# Patient Record
Sex: MALE | Race: OTHER | HISPANIC OR LATINO | Employment: FULL TIME | URBAN - METROPOLITAN AREA
[De-identification: names, ages, dates, MRNs, and addresses within clinical notes are randomized per-mention and may not be internally consistent; named-entity substitution may affect disease eponyms.]

---

## 2019-01-08 ENCOUNTER — HOSPITAL ENCOUNTER (EMERGENCY)
Facility: HOSPITAL | Age: 24
Discharge: HOME | End: 2019-01-08
Attending: EMERGENCY MEDICINE
Payer: COMMERCIAL

## 2019-01-08 VITALS
WEIGHT: 185 LBS | OXYGEN SATURATION: 99 % | DIASTOLIC BLOOD PRESSURE: 74 MMHG | HEIGHT: 72 IN | BODY MASS INDEX: 25.06 KG/M2 | SYSTOLIC BLOOD PRESSURE: 152 MMHG | HEART RATE: 110 BPM | RESPIRATION RATE: 18 BRPM | TEMPERATURE: 99 F

## 2019-01-08 DIAGNOSIS — M25.562 LEFT KNEE PAIN: ICD-10-CM

## 2019-01-08 PROCEDURE — 25000003 PHARM REV CODE 250: Performed by: PHYSICIAN ASSISTANT

## 2019-01-08 PROCEDURE — 99283 EMERGENCY DEPT VISIT LOW MDM: CPT | Mod: 25

## 2019-01-08 RX ORDER — NAPROXEN 500 MG/1
500 TABLET ORAL 2 TIMES DAILY WITH MEALS
Qty: 14 TABLET | Refills: 0 | Status: SHIPPED | OUTPATIENT
Start: 2019-01-08 | End: 2019-07-23

## 2019-01-08 RX ORDER — HYDROCODONE BITARTRATE AND ACETAMINOPHEN 5; 325 MG/1; MG/1
1 TABLET ORAL EVERY 6 HOURS PRN
Qty: 10 TABLET | Refills: 0 | Status: ON HOLD | OUTPATIENT
Start: 2019-01-08 | End: 2019-01-21 | Stop reason: SDUPTHER

## 2019-01-08 RX ORDER — HYDROCODONE BITARTRATE AND ACETAMINOPHEN 10; 325 MG/1; MG/1
1 TABLET ORAL
Status: COMPLETED | OUTPATIENT
Start: 2019-01-08 | End: 2019-01-08

## 2019-01-08 RX ADMIN — HYDROCODONE BITARTRATE AND ACETAMINOPHEN 1 TABLET: 10; 325 TABLET ORAL at 01:01

## 2019-01-08 NOTE — ED PROVIDER NOTES
"Encounter Date: 1/8/2019       History     Chief Complaint   Patient presents with    Knee Pain     pt injured his left knee while working out 1-2 hours pta     Yossi James, a 23 y.o. male that presents to the ED for evaluation of left knee pain after doing a twisting/jumping movement while in a plank position.  Patient states that he had a jolt of pain, but no sensation of "pop".  No previous h/o of surgeries.  No treatments tried PTA.            The history is provided by the patient.     Review of patient's allergies indicates:  No Known Allergies  History reviewed. No pertinent past medical history.  History reviewed. No pertinent surgical history.  No family history on file.  Social History     Tobacco Use    Smoking status: Never Smoker   Substance Use Topics    Alcohol use: No     Frequency: Never    Drug use: Not on file     Review of Systems   Musculoskeletal: Positive for arthralgias (left knee pain ). Negative for joint swelling.   Skin: Negative for color change.   Allergic/Immunologic: Negative for immunocompromised state.   Neurological: Negative for weakness and light-headedness.   All other systems reviewed and are negative.      Physical Exam     Initial Vitals [01/08/19 1254]   BP Pulse Resp Temp SpO2   (!) 152/74 110 18 98.9 °F (37.2 °C) 99 %      MAP       --         Physical Exam    Nursing note and vitals reviewed.  Constitutional: He appears well-developed and well-nourished.   HENT:   Head: Normocephalic and atraumatic.   Right Ear: External ear normal.   Left Ear: External ear normal.   Nose: Nose normal.   Mouth/Throat: Oropharynx is clear and moist.   Eyes: EOM are normal.   Neck: Normal range of motion. Neck supple.   Cardiovascular: Normal rate and regular rhythm.   Pulmonary/Chest: Breath sounds normal. No respiratory distress.   Abdominal: Bowel sounds are normal.   Musculoskeletal: He exhibits no edema.        Left hip: Normal.        Left knee: He exhibits decreased range of " motion. He exhibits no swelling, no effusion, no ecchymosis, no deformity, no laceration, no erythema, normal alignment, no LCL laxity, normal patellar mobility, no bony tenderness, normal meniscus and no MCL laxity. Tenderness found. Lateral joint line and LCL tenderness noted. No patellar tendon tenderness noted.        Left ankle: Normal.   Left knee: no significant increase of pain with varus, valgus testing.  Negative Lochman's.  Other ROM testing limited d/t pain.     Neurological: He is alert and oriented to person, place, and time. No cranial nerve deficit.   Skin: Skin is warm and dry. Capillary refill takes less than 2 seconds. No rash and no abscess noted. No erythema.   Psychiatric: He has a normal mood and affect. Thought content normal.         ED Course   Procedures  Labs Reviewed - No data to display       Imaging Results          X-Ray Knee 3 View Left (Final result)  Result time 01/08/19 13:48:59    Final result by Dawson Kitchen Jr., MD (01/08/19 13:48:59)                 Impression:      No significant abnormality seen.      Electronically signed by: Dawson Kitchen MD  Date:    01/08/2019  Time:    13:48             Narrative:    EXAMINATION:  XR KNEE 3 VIEW LEFT    CLINICAL HISTORY:  Pain in left knee    TECHNIQUE:  AP, lateral, and Merchant views of the left knee were performed.    COMPARISON:  None    FINDINGS:  Bones are well mineralized.  Alignment is satisfactory.  No fracture or effusion.  Alignment is satisfactory.                                 Medical Decision Making:   Initial Assessment:   Acute left knee pain after exercise  Differential Diagnosis:   Fracture, dislocation, internal derangement of knee    ED Management:  Patient presents to ED for evaluation of acute left knee pain.  ROM exam limited d/t pain.  Norco and ice pack given.  X-ray shows no acute abnormality.  Suspect internal injury.  Gave patient instruction on RICE protocol and encouraged f/u with orthopedist for  possible MRI.  Knee immobilizer and crutches given.  Strict return precautions given and patient verbalized understanding.                            Clinical Impression:   The encounter diagnosis was Left knee pain.                             Funmilayo García PA-C  01/08/19 1507

## 2019-01-11 ENCOUNTER — HOSPITAL ENCOUNTER (OUTPATIENT)
Dept: RADIOLOGY | Facility: HOSPITAL | Age: 24
Discharge: HOME | End: 2019-01-11
Attending: PHYSICIAN ASSISTANT
Payer: COMMERCIAL

## 2019-01-11 DIAGNOSIS — M23.92 INTERNAL DERANGEMENT OF LEFT KNEE: ICD-10-CM

## 2019-01-11 DIAGNOSIS — S89.92XA LEFT KNEE INJURY, INITIAL ENCOUNTER: ICD-10-CM

## 2019-01-11 PROCEDURE — 73721 MRI KNEE WITHOUT CONTRAST LEFT: ICD-10-PCS | Mod: 26,LT,, | Performed by: RADIOLOGY

## 2019-01-11 PROCEDURE — 73721 MRI JNT OF LWR EXTRE W/O DYE: CPT | Mod: TC,LT

## 2019-01-11 PROCEDURE — 73721 MRI JNT OF LWR EXTRE W/O DYE: CPT | Mod: 26,LT,, | Performed by: RADIOLOGY

## 2019-01-15 ENCOUNTER — CLINICAL SUPPORT (OUTPATIENT)
Dept: LAB | Facility: HOSPITAL | Age: 24
End: 2019-01-15
Attending: ORTHOPAEDIC SURGERY
Payer: COMMERCIAL

## 2019-01-15 ENCOUNTER — HOSPITAL ENCOUNTER (OUTPATIENT)
Dept: RADIOLOGY | Facility: HOSPITAL | Age: 24
Discharge: HOME | End: 2019-01-15
Attending: ORTHOPAEDIC SURGERY
Payer: COMMERCIAL

## 2019-01-15 DIAGNOSIS — Z01.818 PREOP EXAMINATION: Primary | ICD-10-CM

## 2019-01-15 DIAGNOSIS — Z01.818 PREOP EXAMINATION: ICD-10-CM

## 2019-01-15 PROCEDURE — 71046 XR CHEST PA AND LATERAL: ICD-10-PCS | Mod: 26,,, | Performed by: RADIOLOGY

## 2019-01-15 PROCEDURE — 93010 EKG 12-LEAD: ICD-10-PCS | Mod: ,,, | Performed by: INTERNAL MEDICINE

## 2019-01-15 PROCEDURE — 93005 ELECTROCARDIOGRAM TRACING: CPT

## 2019-01-15 PROCEDURE — 71046 X-RAY EXAM CHEST 2 VIEWS: CPT | Mod: TC,FY

## 2019-01-15 PROCEDURE — 71046 X-RAY EXAM CHEST 2 VIEWS: CPT | Mod: 26,,, | Performed by: RADIOLOGY

## 2019-01-15 PROCEDURE — 93010 ELECTROCARDIOGRAM REPORT: CPT | Mod: ,,, | Performed by: INTERNAL MEDICINE

## 2019-01-18 NOTE — H&P
Reason For Visit    C/O LT Knee pain     History of Present Illness    KNEE PAIN: YURIDIA PACHECO complains of pain to the left knee.   PAIN LOCATED: Laterally, Posteriorly,   ONSET: 1 weeks ago.  QUALITY: Patient states the pain has remained the same   INJURY? Yes. Twisting at gym,   ASSOCIATED SYMPTOM AND TRIGGERS: Standing/Weightbearing, walking, trouble w stairs, stiffness, swelling, limping, stiffness w sitting   USES ASSISTIVE DEVICE: crutch   RELIEVED BY: Rest, Ice/heat, Medications: lortab some relief   PATIENT DENIES: Deformity, bruising, redness   HISTORY: Previous knee injury: No  mri shows acute posterior horn meniscus tear     Allergies   · No Known Environmental Allergies   · No Known Food Allergies   · No Known Allergies    Current Meds    Medication Name Instruction   Hydrocodone-Acetaminophen 5-325 MG Oral Tablet    Naproxen 500 MG Oral Tablet      Active Problems   · Internal derangement of left knee (M23.92)   · Left knee injury, initial encounter (S89.92XA)   · Quadriceps weakness (M62.81)    Past Medical History   · No pertinent past medical history   · History of No pertinent past surgical history    Family History   · Family history of arthritis (Z82.61)    Social History   · Never a smoker   · No alcohol use    Review of Systems    Review of systems have been reviewed and noted on the intake form     Vitals   Recorded: 15Jan2019 01:34PM   Height 6 ft    Weight 186 lb 4 oz   BMI Calculated 25.26   BSA Calculated 2.07   Systolic 120   Diastolic 78   Heart Rate 103   Respiration 18   Pain Scale 0   Location: Knee     Physical Exam    Mr. YURIDIA PACHECO is a 23 year old man seen at the request of Colette El for left knee pain.   He is a well developed male who is awake and alert and cooperative for the exam. Affect is appropriate, gait is antalgic .  The lower extremities are equal in length and in alignment. The contralateral extremity demonstrates FROM hip, knee, ankle, foot and toes.  The  affected extremity demonstrates effusion of the knee.  There is lateral joint line tenderness exacerbated by joint line palpation and compressive maneuvers   Lacie's is positive   The knee is ligamentously stable.  crepitus: no   Sensory is intact to light touch, pulses are intact bilaterally, and skin is clear and dry bilaterally  Left Knee ROM 15 - 40  Normal Muscle Tone Bilaterally     Assessment   1. Internal derangement of left knee (M23.92)    Plan    The patient presents today for Surgery for Left knee (ATS). MRI revealed a tear of the meniscus of the left knee. We discussed these findings with the patient. We did discuss arthroscopy and the fact that its role would hopefully help the symptoms related to meniscal tear; however, would not necessarily address any symptoms related to other cartilage damage, inflammation or injury. The patient was also offered a course of PT first but would rather move forward with arthroscopy. The patient understands this and would like to move forward. I think that is reasonable. We spent approx 15 min face to face reviewing the risks and benefits of surgery. We also reviewed the role of physical therapy vs arthroscopy. The patient would like the more quick improvement compared to the delayed improvement with PT. We will go ahead and schedule this at a time that is convenient for the patient.    There is no interval change to the H&P documented above.

## 2019-01-21 ENCOUNTER — ANESTHESIA EVENT (OUTPATIENT)
Dept: SURGERY | Facility: HOSPITAL | Age: 24
DRG: 489 | End: 2019-01-21
Payer: COMMERCIAL

## 2019-01-21 ENCOUNTER — HOSPITAL ENCOUNTER (INPATIENT)
Facility: HOSPITAL | Age: 24
LOS: 1 days | Discharge: HOME | DRG: 489 | End: 2019-01-21
Attending: ORTHOPAEDIC SURGERY | Admitting: ORTHOPAEDIC SURGERY
Payer: COMMERCIAL

## 2019-01-21 ENCOUNTER — ANESTHESIA (OUTPATIENT)
Dept: SURGERY | Facility: HOSPITAL | Age: 24
DRG: 489 | End: 2019-01-21
Payer: COMMERCIAL

## 2019-01-21 VITALS
OXYGEN SATURATION: 98 % | TEMPERATURE: 98 F | BODY MASS INDEX: 25.06 KG/M2 | HEIGHT: 72 IN | DIASTOLIC BLOOD PRESSURE: 64 MMHG | RESPIRATION RATE: 18 BRPM | HEART RATE: 66 BPM | SYSTOLIC BLOOD PRESSURE: 128 MMHG | WEIGHT: 185 LBS

## 2019-01-21 DIAGNOSIS — M17.9 OSTEOARTHRITIS OF KNEE, UNSPECIFIED LATERALITY, UNSPECIFIED OSTEOARTHRITIS TYPE: Primary | ICD-10-CM

## 2019-01-21 LAB
APPEARANCE FLD: NORMAL
BODY FLD TYPE: NORMAL
COLOR FLD: YELLOW
LYMPHOCYTES NFR FLD MANUAL: 29 %
MONOS+MACROS NFR FLD MANUAL: 61 %
NEUTROPHILS NFR FLD MANUAL: 10 %
WBC # FLD: 393 /CU MM

## 2019-01-21 PROCEDURE — 63600175 PHARM REV CODE 636 W HCPCS: Performed by: STUDENT IN AN ORGANIZED HEALTH CARE EDUCATION/TRAINING PROGRAM

## 2019-01-21 PROCEDURE — 71000016 HC POSTOP RECOV ADDL HR: Performed by: ORTHOPAEDIC SURGERY

## 2019-01-21 PROCEDURE — 25000003 PHARM REV CODE 250: Performed by: STUDENT IN AN ORGANIZED HEALTH CARE EDUCATION/TRAINING PROGRAM

## 2019-01-21 PROCEDURE — 63600175 PHARM REV CODE 636 W HCPCS: Performed by: ANESTHESIOLOGY

## 2019-01-21 PROCEDURE — 36000710: Performed by: ORTHOPAEDIC SURGERY

## 2019-01-21 PROCEDURE — 25000003 PHARM REV CODE 250: Performed by: ANESTHESIOLOGY

## 2019-01-21 PROCEDURE — 71000033 HC RECOVERY, INTIAL HOUR: Performed by: ORTHOPAEDIC SURGERY

## 2019-01-21 PROCEDURE — 63600175 PHARM REV CODE 636 W HCPCS: Performed by: ORTHOPAEDIC SURGERY

## 2019-01-21 PROCEDURE — 25000003 PHARM REV CODE 250: Performed by: ORTHOPAEDIC SURGERY

## 2019-01-21 PROCEDURE — 89051 BODY FLUID CELL COUNT: CPT

## 2019-01-21 PROCEDURE — 27201423 OPTIME MED/SURG SUP & DEVICES STERILE SUPPLY: Performed by: ORTHOPAEDIC SURGERY

## 2019-01-21 PROCEDURE — 37000009 HC ANESTHESIA EA ADD 15 MINS: Performed by: ORTHOPAEDIC SURGERY

## 2019-01-21 PROCEDURE — 37000008 HC ANESTHESIA 1ST 15 MINUTES: Performed by: ORTHOPAEDIC SURGERY

## 2019-01-21 PROCEDURE — 12000002 HC ACUTE/MED SURGE SEMI-PRIVATE ROOM

## 2019-01-21 PROCEDURE — C1713 ANCHOR/SCREW BN/BN,TIS/BN: HCPCS | Performed by: ORTHOPAEDIC SURGERY

## 2019-01-21 PROCEDURE — S0020 INJECTION, BUPIVICAINE HYDRO: HCPCS | Performed by: ORTHOPAEDIC SURGERY

## 2019-01-21 PROCEDURE — 71000015 HC POSTOP RECOV 1ST HR: Performed by: ORTHOPAEDIC SURGERY

## 2019-01-21 PROCEDURE — 36000711: Performed by: ORTHOPAEDIC SURGERY

## 2019-01-21 DEVICE — IMPLANTABLE DEVICE: Type: IMPLANTABLE DEVICE | Site: KNEE | Status: FUNCTIONAL

## 2019-01-21 DEVICE — KIT SEQUENT KNEE RECON: Type: IMPLANTABLE DEVICE | Site: KNEE | Status: FUNCTIONAL

## 2019-01-21 RX ORDER — SODIUM CHLORIDE, SODIUM LACTATE, POTASSIUM CHLORIDE, CALCIUM CHLORIDE 600; 310; 30; 20 MG/100ML; MG/100ML; MG/100ML; MG/100ML
INJECTION, SOLUTION INTRAVENOUS CONTINUOUS PRN
Status: DISCONTINUED | OUTPATIENT
Start: 2019-01-21 | End: 2019-01-21

## 2019-01-21 RX ORDER — MIDAZOLAM HYDROCHLORIDE 1 MG/ML
INJECTION, SOLUTION INTRAMUSCULAR; INTRAVENOUS
Status: DISCONTINUED | OUTPATIENT
Start: 2019-01-21 | End: 2019-01-21

## 2019-01-21 RX ORDER — ROCURONIUM BROMIDE 10 MG/ML
INJECTION, SOLUTION INTRAVENOUS
Status: DISCONTINUED | OUTPATIENT
Start: 2019-01-21 | End: 2019-01-21

## 2019-01-21 RX ORDER — CEFAZOLIN SODIUM 2 G/50ML
2 SOLUTION INTRAVENOUS ONCE
Status: COMPLETED | OUTPATIENT
Start: 2019-01-21 | End: 2019-01-21

## 2019-01-21 RX ORDER — CEFAZOLIN SODIUM 2 G/50ML
2 SOLUTION INTRAVENOUS
Status: DISCONTINUED | OUTPATIENT
Start: 2019-01-21 | End: 2019-07-23

## 2019-01-21 RX ORDER — SODIUM CHLORIDE 0.9 % (FLUSH) 0.9 %
3 SYRINGE (ML) INJECTION
Status: DISCONTINUED | OUTPATIENT
Start: 2019-01-21 | End: 2019-01-21 | Stop reason: HOSPADM

## 2019-01-21 RX ORDER — DEXAMETHASONE SODIUM PHOSPHATE 4 MG/ML
INJECTION, SOLUTION INTRA-ARTICULAR; INTRALESIONAL; INTRAMUSCULAR; INTRAVENOUS; SOFT TISSUE
Status: DISCONTINUED | OUTPATIENT
Start: 2019-01-21 | End: 2019-01-21

## 2019-01-21 RX ORDER — PHENYLEPHRINE HYDROCHLORIDE 10 MG/ML
INJECTION INTRAVENOUS
Status: DISCONTINUED | OUTPATIENT
Start: 2019-01-21 | End: 2019-01-21

## 2019-01-21 RX ORDER — BUPIVACAINE HYDROCHLORIDE 5 MG/ML
INJECTION, SOLUTION EPIDURAL; INTRACAUDAL
Status: DISCONTINUED | OUTPATIENT
Start: 2019-01-21 | End: 2019-01-21 | Stop reason: HOSPADM

## 2019-01-21 RX ORDER — EPINEPHRINE 1 MG/ML
INJECTION, SOLUTION INTRACARDIAC; INTRAMUSCULAR; INTRAVENOUS; SUBCUTANEOUS
Status: DISCONTINUED | OUTPATIENT
Start: 2019-01-21 | End: 2019-01-21 | Stop reason: HOSPADM

## 2019-01-21 RX ORDER — FENTANYL CITRATE 50 UG/ML
INJECTION, SOLUTION INTRAMUSCULAR; INTRAVENOUS
Status: DISCONTINUED | OUTPATIENT
Start: 2019-01-21 | End: 2019-01-21

## 2019-01-21 RX ORDER — KETOROLAC TROMETHAMINE 30 MG/ML
INJECTION, SOLUTION INTRAMUSCULAR; INTRAVENOUS
Status: DISCONTINUED | OUTPATIENT
Start: 2019-01-21 | End: 2019-01-21

## 2019-01-21 RX ORDER — ONDANSETRON 2 MG/ML
INJECTION INTRAMUSCULAR; INTRAVENOUS
Status: DISCONTINUED | OUTPATIENT
Start: 2019-01-21 | End: 2019-01-21

## 2019-01-21 RX ORDER — HYDROMORPHONE HYDROCHLORIDE 2 MG/ML
0.5 INJECTION, SOLUTION INTRAMUSCULAR; INTRAVENOUS; SUBCUTANEOUS EVERY 5 MIN PRN
Status: DISCONTINUED | OUTPATIENT
Start: 2019-01-21 | End: 2019-01-21 | Stop reason: HOSPADM

## 2019-01-21 RX ORDER — ACETAMINOPHEN 10 MG/ML
INJECTION, SOLUTION INTRAVENOUS
Status: DISCONTINUED | OUTPATIENT
Start: 2019-01-21 | End: 2019-01-21

## 2019-01-21 RX ORDER — MEPERIDINE HYDROCHLORIDE 50 MG/ML
12.5 INJECTION INTRAMUSCULAR; INTRAVENOUS; SUBCUTANEOUS ONCE AS NEEDED
Status: DISCONTINUED | OUTPATIENT
Start: 2019-01-21 | End: 2019-01-21 | Stop reason: HOSPADM

## 2019-01-21 RX ORDER — SODIUM CHLORIDE 0.9 % (FLUSH) 0.9 %
3 SYRINGE (ML) INJECTION EVERY 8 HOURS
Status: DISCONTINUED | OUTPATIENT
Start: 2019-01-21 | End: 2019-01-21 | Stop reason: HOSPADM

## 2019-01-21 RX ORDER — ONDANSETRON 2 MG/ML
4 INJECTION INTRAMUSCULAR; INTRAVENOUS DAILY PRN
Status: DISCONTINUED | OUTPATIENT
Start: 2019-01-21 | End: 2019-01-21 | Stop reason: HOSPADM

## 2019-01-21 RX ORDER — LIDOCAINE HCL/PF 100 MG/5ML
SYRINGE (ML) INTRAVENOUS
Status: DISCONTINUED | OUTPATIENT
Start: 2019-01-21 | End: 2019-01-21

## 2019-01-21 RX ORDER — HYDROCODONE BITARTRATE AND ACETAMINOPHEN 5; 325 MG/1; MG/1
1 TABLET ORAL EVERY 6 HOURS PRN
Qty: 10 TABLET | Refills: 0 | Status: SHIPPED | OUTPATIENT
Start: 2019-01-21 | End: 2019-07-23

## 2019-01-21 RX ORDER — NEOSTIGMINE METHYLSULFATE 1 MG/ML
INJECTION, SOLUTION INTRAVENOUS
Status: DISCONTINUED | OUTPATIENT
Start: 2019-01-21 | End: 2019-01-21

## 2019-01-21 RX ORDER — GLYCOPYRROLATE 0.2 MG/ML
INJECTION INTRAMUSCULAR; INTRAVENOUS
Status: DISCONTINUED | OUTPATIENT
Start: 2019-01-21 | End: 2019-01-21

## 2019-01-21 RX ORDER — HYDROCODONE BITARTRATE AND ACETAMINOPHEN 5; 325 MG/1; MG/1
1 TABLET ORAL EVERY 4 HOURS PRN
Status: DISCONTINUED | OUTPATIENT
Start: 2019-01-21 | End: 2019-01-21 | Stop reason: HOSPADM

## 2019-01-21 RX ORDER — PROPOFOL 10 MG/ML
INJECTION, EMULSION INTRAVENOUS
Status: DISCONTINUED | OUTPATIENT
Start: 2019-01-21 | End: 2019-01-21

## 2019-01-21 RX ADMIN — LIDOCAINE HYDROCHLORIDE 80 MG: 20 INJECTION, SOLUTION INTRAVENOUS at 07:01

## 2019-01-21 RX ADMIN — DEXAMETHASONE SODIUM PHOSPHATE 8 MG: 4 INJECTION, SOLUTION INTRAMUSCULAR; INTRAVENOUS at 07:01

## 2019-01-21 RX ADMIN — ONDANSETRON 4 MG: 2 INJECTION, SOLUTION INTRAMUSCULAR; INTRAVENOUS at 07:01

## 2019-01-21 RX ADMIN — HYDROCODONE BITARTRATE AND ACETAMINOPHEN 1 TABLET: 5; 325 TABLET ORAL at 09:01

## 2019-01-21 RX ADMIN — ROCURONIUM BROMIDE 50 MG: 10 INJECTION, SOLUTION INTRAVENOUS at 07:01

## 2019-01-21 RX ADMIN — PHENYLEPHRINE HYDROCHLORIDE 100 MCG: 10 INJECTION INTRAVENOUS at 07:01

## 2019-01-21 RX ADMIN — HYDROMORPHONE HYDROCHLORIDE 0.5 MG: 2 INJECTION INTRAMUSCULAR; INTRAVENOUS; SUBCUTANEOUS at 09:01

## 2019-01-21 RX ADMIN — MIDAZOLAM 2 MG: 1 INJECTION INTRAMUSCULAR; INTRAVENOUS at 06:01

## 2019-01-21 RX ADMIN — SODIUM CHLORIDE, SODIUM LACTATE, POTASSIUM CHLORIDE, AND CALCIUM CHLORIDE: .6; .31; .03; .02 INJECTION, SOLUTION INTRAVENOUS at 06:01

## 2019-01-21 RX ADMIN — DEXAMETHASONE SODIUM PHOSPHATE 12 MG: 4 INJECTION, SOLUTION INTRAMUSCULAR; INTRAVENOUS at 07:01

## 2019-01-21 RX ADMIN — PROPOFOL 200 MG: 10 INJECTION, EMULSION INTRAVENOUS at 07:01

## 2019-01-21 RX ADMIN — FENTANYL CITRATE 150 MCG: 50 INJECTION, SOLUTION INTRAMUSCULAR; INTRAVENOUS at 07:01

## 2019-01-21 RX ADMIN — CEFAZOLIN SODIUM 2 G: 2 SOLUTION INTRAVENOUS at 07:01

## 2019-01-21 RX ADMIN — ACETAMINOPHEN 1000 MG: 10 INJECTION, SOLUTION INTRAVENOUS at 07:01

## 2019-01-21 RX ADMIN — NEOSTIGMINE METHYLSULFATE 3 MG: 1 INJECTION INTRAVENOUS at 07:01

## 2019-01-21 RX ADMIN — GLYCOPYRROLATE 0.6 MG: 0.2 INJECTION, SOLUTION INTRAMUSCULAR; INTRAVENOUS at 07:01

## 2019-01-21 RX ADMIN — KETOROLAC TROMETHAMINE 30 MG: 30 INJECTION, SOLUTION INTRAMUSCULAR; INTRAVENOUS at 07:01

## 2019-01-21 NOTE — ANESTHESIA POSTPROCEDURE EVALUATION
Anesthesia Post Evaluation    Patient: Yossi James    Procedure(s) Performed: Procedure(s) (LRB):  REPAIR, MENISCUS, KNEE (Left)    Final Anesthesia Type: general  Patient location during evaluation: PACU  Patient participation: Yes- Able to Participate  Level of consciousness: awake and alert  Post-procedure vital signs: reviewed and stable  Pain management: adequate  Airway patency: patent  PONV status at discharge: No PONV  Anesthetic complications: no      Cardiovascular status: blood pressure returned to baseline  Respiratory status: unassisted  Hydration status: euvolemic  Follow-up not needed.        Visit Vitals  /62 (BP Location: Right arm, Patient Position: Lying)   Pulse 86   Temp 37.1 °C (98.8 °F) (Skin)   Resp 17   Ht 6' (1.829 m)   Wt 83.9 kg (185 lb)   SpO2 100%   BMI 25.09 kg/m²       Pain/Geovanni Score: No Data Recorded

## 2019-01-21 NOTE — ANESTHESIA PREPROCEDURE EVALUATION
01/21/2019  Yossi James is a 23 y.o., male.    Anesthesia Evaluation     I have reviewed the Nursing Notes.   I have reviewed the Medications.     Review of Systems  Anesthesia Hx:  No problems with previous Anesthesia Denies Hx of Anesthetic complications Denies Family Hx of Anesthesia complications.    Social:  Non-Smoker, No Alcohol Use    Hematology/Oncology:  Hematology Normal   Oncology Normal     EENT/Dental:EENT/Dental Normal   Cardiovascular:  Cardiovascular Normal Exercise tolerance: good     Pulmonary:  Pulmonary Normal    Renal/:  Renal/ Normal     Hepatic/GI:  Hepatic/GI Normal    Musculoskeletal:  Musculoskeletal Normal    Neurological:  Neurology Normal    Endocrine:  Endocrine Normal        Physical Exam  General:  Well nourished    Airway/Jaw/Neck:  Airway Findings: Mouth Opening: Normal Tongue: Normal  General Airway Assessment: Adult                 Anesthesia Plan  Type of Anesthesia, risks & benefits discussed:  Anesthesia Type:  general  Patient's Preference:   Intra-op Monitoring Plan:   Intra-op Monitoring Plan Comments:   Post Op Pain Control Plan: multimodal analgesia  Post Op Pain Control Plan Comments:   Induction:    Beta Blocker:         Informed Consent: Patient understands risks and agrees with Anesthesia plan.  Questions answered.   ASA Score: 1     Day of Surgery Review of History & Physical:            Ready For Surgery From Anesthesia Perspective.

## 2019-01-21 NOTE — DISCHARGE INSTRUCTIONS
Discharge Instructions for Knee Arthroscopy  You had knee arthroscopy. This surgical procedure uses small incisions to locate, identify, and treat problems inside the knee. These problems include loose bodies, meniscal tears, bone spurs, osteochondritis dissecans (OCD), and synovitis. Below are tips to help speed your recovery from surgery.    Activity  · Dont drive until your doctor says its OK. And never drive while taking opioid pain medicine.  · Remember to take pain medicines as directed; dont wait for the pain to get bad. And don't drink alcohol while taking pain medicines.  · Follow weight-bearing instructions given by your doctor. He or she may require you to use crutches to keep weight off your knee.  · Unless your doctor tells you otherwise, begin using the affected knee as much as you can tolerate 3 days after surgery.  · Slowly bend and straighten your affected leg as far as you can, unless your doctor tells you otherwise. Do this several times a day.  · Rest your knee by lying down and putting pillows under it for the first 3 days after surgery. Keep your ankle elevated above the level of your heart. This helps keep swelling down.  · Follow your doctors instructions about wearing and caring for a brace, immobilizer, or elastic dressing. Partial weight bearing with crutches.  · Point and flex your foot, and rotate your ankle as much as possible during the first few weeks following surgery. Also, wiggle your toes as much as possible.  ·   Incision care  · Check your incision daily for redness, tenderness, or drainage.  · Dont be alarmed if there is some bruising, slight swelling of the knee, or a small amount of blood on the bandage.  · Adjust the bandage or brace as needed. It should feel supportive on your knee, but not too tight.   · Dont soak your incision in water (no hot tubs, bathtubs, swimming pools) until your doctor says its OK.  · Wait 4 day(s) after your surgery to begin showering.  Then shower as needed. Cover your knee with plastic to keep the dressing or brace dry. Once your dressing is removed, follow your doctors instructions for care of the wound. And sit on a shower stool so that you dont fall while showering.  · Use an ice pack or bag of frozen peas--or something similar--wrapped in a thin towel to reduce the swelling. Keep the foot elevated while you ice the knee. Apply the ice pack for 20 minutes; then remove it for 20 minutes. Repeat as needed. Icing helps reduce swelling.  ·   Other precautions  · Arrange your household to keep the items you need within reach.  · Remove throw rugs, electrical cords, and anything else that may cause you to fall.  · Use nonslip bath mats, grab bars, an elevated toilet seat, and a shower chair in your bathroom.  · Use a cane, crutches, a walker, or handrails until your balance, flexibility, and strength improve, and you can put weight on your leg. And remember to ask for help from others when you need it.  · Free up your hands so that you can use them to keep balance. Use a al pack, apron, or pockets to carry things.  ·   Follow-up  Make a follow-up appointment as directed by your doctor.     When to seek medical attention  Call 911 right away if you have any of the following:  · Chest pain  · Shortness of breath  · Severe nausea  Otherwise, call your doctor immediately if you have any of the following:  · Pain that is not relieved by medicine or rest  · Continued bleeding through the bandage  · Tingling, numbness, or coldness in your foot or leg  · Fever above 100.4°F (38.0°C) or shaking chills  · Excessive swelling, increased redness, or any drainage around the incision  · Swelling, tenderness, or pain in your leg        ANESTHESIA  -For the first 24 hours after surgery:  Do not drive, use heavy equipment, make important decisions, or drink alcohol  -It is normal to feel sleepy for several hours.  Rest until you are more awake.  -Have someone  stay with you, if needed.  They can watch for problems and help keep you safe.  -Some possible post anesthesia side effects include: nausea and vomiting, sore throat and hoarseness, sleepiness, and dizziness.    PAIN  -If you have pain after surgery, pain medicine will help you feel better.  Take it as directed, before pain becomes severe.  Most pain relievers taken by mouth need at least 20-30 minutes to start working.  -Do not drive or drink alcohol while taking pain medicine.  -Pain medication can upset your stomach.  Taking them with a little food may help.  -Other ways to help control pain: elevation, ice, and relaxation  -Call your surgeon if still having unmanageable pain an hour after taking pain medicine.  -Pain medicine can cause constipation.  Taking an over-the counter stool softener while on prescription pain medicine and drinking plenty of fluids can prevent this side effect.  -Call your surgeon if you have severe side effects like: breathing problems, trouble waking up, dizziness, confusion, or severe constipation.    NAUSEA  -Some people have nausea after surgery.  This is often because of anesthesia, pain, pain medicine, or the stress of surgery.  -Do not push yourself to eat.  Start off with clear liquids and soup.  Slowly move to solid foods.  Don't eat fatty, rich, spicy foods at first.  Eat smaller amounts.  -If you develop persistent nausea and vomiting please notify your surgeon immediately.    BLEEDING  -Different types of surgery require different types of care and dressing changes.  It is important to follow all instructions and advice from your surgeon.  Change dressing as directed.  Call your surgeon for any concerns regarding postop bleeding.    SIGNS OF INFECTION  -Signs of infection include: fever, swelling, drainage, and redness  -Notify your surgeon if you have a fever of 100.4 F (38.0 C) or higher.  -Notify your surgeon if you notice redness, swelling, increased pain, pus, or a foul  smell at the incision site.        Dressing to stay in place until POD 4  Partial weightbearing LLE w/ crutches until clinic f/u in 2wks

## 2019-01-21 NOTE — BRIEF OP NOTE
[facility]  Brief Operative Note     SUMMARY     Surgery Date: 1/21/2019     Surgeon(s) and Role:     * Hubert Mcgrath MD - Primary    Assisting Surgeon: None    Pre-op Diagnosis:  Other meniscus derangements, other medial meniscus, left knee [M23.332]    Post-op Diagnosis:  Post-Op Diagnosis Codes:     * Other meniscus derangements, other medial meniscus, left knee [M23.332]    Procedure(s) (LRB):  REPAIR, MENISCUS, KNEE (Left)    Anesthesia: General    Description of the findings of the procedure: Left lateral meniscus posterior horn tear    Findings/Key Components: same as above    Estimated Blood Loss: * No values recorded between 1/21/2019  7:22 AM and 1/21/2019  8:04 AM *         Specimens:   Specimen (12h ago, onward)    None          Discharge Note    SUMMARY     Admit Date: 1/21/2019    Discharge Date and Time:  01/21/2019 8:05 AM    Hospital Course (synopsis of major diagnoses, care, treatment, and services provided during the course of the hospital stay): Patient presented to [facility] on day of scheduled surgery and underwent L knee lateral meniscus tear w/ suture fixation, please see operative report for further detail. Patient did well postoperatively and was found to be fit for discharge on same day. Discharged home.         Final Diagnosis: Post-Op Diagnosis Codes:     * Other meniscus derangements, other medial meniscus, left knee [M23.332]    Disposition: Home or Self Care    Follow Up/Patient Instructions:     Medications:  Reconciled Home Medications:      Medication List      CONTINUE taking these medications    HYDROcodone-acetaminophen 5-325 mg per tablet  Commonly known as:  NORCO  Take 1 tablet by mouth every 6 (six) hours as needed for Pain (Take 1-2 tablets as needed for pain).     naproxen 500 MG tablet  Commonly known as:  NAPROSYN  Take 1 tablet (500 mg total) by mouth 2 (two) times daily with meals.          Discharge Procedure Orders   Sponge bath only until clinic  visit     Keep surgical extremity elevated     Ice to affected area     Activity as tolerated     Follow-up Information     Hubert Mcgrath MD In 2 weeks.    Specialty:  Orthopedic Surgery  Contact information:  [address]  [address]  [place] [address] [phone]

## 2019-01-21 NOTE — OP NOTE
DATE OF PROCEDURE:  01/21/2019.    PREOPERATIVE DIAGNOSIS:  Left knee medial meniscal tear.    POSTOPERATIVE DIAGNOSIS:  Left knee medial meniscal tear.    PROCEDURE:  Arthroscopic posterior horn medial meniscal repair.    ATTENDING SURGEON:  Hubert Mcgrath M.D.    ASSISTANT:  Dr. Poole and Dr. Cope.    COMPLICATIONS:  None.    IMPLANTS:  Linvatec Sequent meniscal repair anchors.    INDICATIONS:  This is a 23-year-old male who twisted his knee while working out,   felt a pop.  MRI revealed a posterior horn medial meniscal tear.  The patient   had locking of the knee and significant pain and swelling.  Operative and   nonoperative management discussed with the patient.  We also discussed meniscal   repair versus meniscectomy.  He understood this and agreed to move forward with   arthroscopy.  He felt benefits of attempted meniscal repair outweighed the   risks.    FINDINGS:  The patient did have an unstable subluxing posterior horn of the   medial meniscus, which was subluxing into the joint more than 50%.    PROCEDURE IN DETAILS:  The patient was brought to the Operating Room and placed   supine on the operative table.  General anesthesia was induced without any   difficulties.  Left knee was placed in leg chou.  Foot table flexed.    Posterior aspect of the right knee was well padded.  Prior to incision, proper   site and procedure as well as antibiotic administration were verified.    Anterolateral and anteromedial portal sites were injected Marcaine with   epinephrine.  An #11 blade was used to establish anterolateral portals then   dilated using a trocar and cannula and camera placed in the suprapatellar pouch.    Undersurface of the patella was visualized, which showed a grade I softening   as did the trochlea slight lateral tracking of the patella within the trochlear   groove.  Knee was then flexed, medial compartment visualized.  Spinal needle was   used to establish anteromedial portals then created using  #11 blade and dilated   using a trocar.  Knee was then placed in valgus position and medial compartment   probed.  Medial meniscus was intact with no tears or disruptions.  Knee was   then flexed, ACL visualized.  This was intact with no tears or disruptions.    There was some slight bruising at the base of the ACL.    Knee was then placed in figure-of-four position and lateral compartment   visualized.  Lateral meniscus posterior horn had a subluxation with the ability   to bring the posterior horn from the popliteal hiatus to the posterior root with   intact posterior root well past the middle of the lateral compartment almost   touching the anterior horn.  The remainder of the lateral meniscus was intact   from the mid body to the anterior horn.  At this point, I elected to do   all-inside suture repair of the posterior horn of the lateral meniscus.  Using   the Pix[employer] meniscal repair system, one anchor was placed just medial to the   popliteal hiatus where the original attachment to the capsule was.  This was   introduced into the posterior capsule and then the anchor deployed.  We then   placed another anchor horizontal directly medial to this in a horizontal fashion   just next to the meniscal root throughout 1 to 2 cm away.  We then deployed the   anchor and assessed our fixation and the meniscal tear had been reattached to   the posterior capsule.  Using arthroscopic scissors, we then cut the suture and   then reassessed meniscal stability and posterior horn of the medial meniscus was   successfully reattached.  I then placed the cannula back at the camera in the   suprapatellar pouch.  All excess fluid was evacuated via the cannula.  Portal   sites were then closed with Dermabond and Steri-Strips, injected Marcaine with   epinephrine and dressed with sterile ABD and ACE wrap.  The patient was then   extubated by Anesthesia without any difficulties and transferred to Recovery   Room bed in stable  condition.      VD/HN  dd: 01/21/2019 09:27:26 (CST)  td: 01/21/2019 09:52:42 (CST)  Doc ID   #[id number]  Job ID #[id number]    CC:

## 2019-01-21 NOTE — ANESTHESIA POSTPROCEDURE EVALUATION
Anesthesia Post Evaluation    Patient: Yossi James    Procedure(s) Performed: Procedure(s) (LRB):  REPAIR, MENISCUS, KNEE (Left)    Final Anesthesia Type: general  Patient location during evaluation: PACU  Patient participation: Yes- Able to Participate  Level of consciousness: awake and alert and oriented  Post-procedure vital signs: reviewed and stable  Pain management: adequate  Airway patency: patent  PONV status at discharge: No PONV  Anesthetic complications: no      Cardiovascular status: blood pressure returned to baseline, hemodynamically stable and stable  Respiratory status: face mask  Hydration status: euvolemic  Follow-up not needed.        Visit Vitals  /62 (BP Location: Right arm, Patient Position: Lying)   Pulse 86   Temp 37.1 °C (98.8 °F) (Skin)   Resp 17   Ht 6' (1.829 m)   Wt 83.9 kg (185 lb)   SpO2 100%   BMI 25.09 kg/m²       Pain/Geovanni Score: No Data Recorded

## 2019-01-28 NOTE — PHYSICIAN QUERY
PT Name: Yossi James  MR #: [id number]     Physician Query Form - Documentation Clarification      CDS: Tana Maldonado RN     Contact information:  [email or web address]    Phone: [phone]    This form is a permanent document in the medical record.     Query Date: January 28, 2019    By submitting this query, we are merely seeking further clarification of documentation. Please utilize your independent clinical judgment when addressing the question(s) below.    The Medical record reflects the following:    Supporting Clinical Findings Location in Medical Record     PREOPERATIVE DIAGNOSIS:  Left knee medial meniscal tear.     POSTOPERATIVE DIAGNOSIS:  Left knee medial meniscal tear.    PROCEDURE: Arthroscopic posterior horn medial meniscal repair.      Knee was then placed in valgus position and medial compartment  probed.  Medial meniscus was intact with no tears or disruptions    Lateral meniscus posterior horn had a subluxation with the ability to bring the posterior horn from the popliteal hiatus to the posterior root with  intact posterior root well past the middle of the lateral compartment almost touching the anterior horn. The remainder of the lateral meniscus was intact from the mid body to the anterior horn.  At this point, I elected to do all-inside suture repair of the posterior horn of the lateral meniscus    L knee lateral meniscus tear w/ suture fixation       Op Note 1/21/19       Op Note 1/21/19       Op Note 1/21/19       Op Note 1/21/19         Op Note 1/21/19                     DS 1/21/19                                                                                Doctor, please clarify the conflicting documentation associated with above clinical findings.    Provider Use Only          [X  ] L knee lateral meniscus tear w/ suture fixation       [  ] L knee posterior horn medial meniscal repair      [  ] Other (please specify)_______________________                                                                                                                  [  ] Clinically Undetermined

## 2019-01-31 DIAGNOSIS — M25.562 KNEE MENISCUS PAIN, LEFT: Primary | ICD-10-CM

## 2019-02-01 ENCOUNTER — CLINICAL SUPPORT (OUTPATIENT)
Dept: REHABILITATION | Facility: HOSPITAL | Age: 24
End: 2019-02-01
Payer: COMMERCIAL

## 2019-02-01 DIAGNOSIS — R26.89 DECREASED FUNCTIONAL MOBILITY: ICD-10-CM

## 2019-02-01 DIAGNOSIS — R26.2 DIFFICULTY WALKING: ICD-10-CM

## 2019-02-01 DIAGNOSIS — M25.662 DECREASED RANGE OF MOTION OF LEFT KNEE: ICD-10-CM

## 2019-02-01 DIAGNOSIS — R53.1 DECREASED STRENGTH: ICD-10-CM

## 2019-02-01 DIAGNOSIS — M25.562 LEFT KNEE PAIN, UNSPECIFIED CHRONICITY: ICD-10-CM

## 2019-02-01 PROCEDURE — 97110 THERAPEUTIC EXERCISES: CPT | Mod: PN

## 2019-02-01 PROCEDURE — 97161 PT EVAL LOW COMPLEX 20 MIN: CPT | Mod: PN

## 2019-02-01 PROCEDURE — 97116 GAIT TRAINING THERAPY: CPT | Mod: PN

## 2019-02-01 NOTE — PATIENT INSTRUCTIONS
Strengthening: Quadriceps Set    Tighten muscles on top of thighs by pushing knees down into surface. Hold 5 seconds.  Repeat 10 times left leg per set. Do 1-2 sets per session. Do 2 sessions per day.          Gastroc, Sitting (Passive)    Sit with leg straight out in front of you and a towel under your heel and around ball of foot. Gently pull toward body. Hold 30 seconds.   Repeat 3 times left side per set. Do 1 sets per session. Do 2 sessions per day.    Hamstring Stretch        Sitting with operated leg straight on bed, and foot of other leg on floor, lean forward toward toes of straight leg. Hold 30 seconds.  Repeat 3 times. Do 2 sessions per day.     [email or web address]     Copyright © I. All rights reserved.     Heel Slides    With towel around left heel, gently pull knee up with towel until stretch is felt. Hold 5 seconds.  Repeat 10 times left leg per set. Do 1 sets per session. Do 2 sessions per day.    Extension, Long Arc Quads - Sitting    Raise leg until knee is straight.  Repeat 10 times left leg per set. Do 1-2 sets per session. Do 2 sessions per day.

## 2019-02-01 NOTE — PLAN OF CARE
"[facility]  Physical Therapy Initial Evaluation    Name: Yossi James  Clinic Number: [id number]    Therapy Diagnosis:   Encounter Diagnoses   Name Primary?    Left knee pain, unspecified chronicity     Decreased range of motion of left knee     Difficulty walking     Decreased functional mobility     Decreased strength      Physician: Hubert Mcgrath MD    Physician Orders: PT Eval and Treat PWB LLE  Medical Diagnosis from Referral: L meniscus tear  Evaluation Date: 2/1/2019  Authorization Period Expiration: 12/31/2019  Plan of Care Expiration: 2/1/2019 to 4/26/2019  Visit # / Visits authorized: 1/ 30  FOTO: 1/10    Time In: 0910  Time Out: 1005  Total Billable Time: 50 minutes (Low Complexity Eval - 1, Therapeutic exercise -1, Gait -1)    Precautions: Standard, PWB LLE    Subjective     Date of onset: DOS: 1/21/19    History of current condition - Yossi reports: he was at the gym, was joining core stabilization, and twisted his knee on  1/8/19. The knee was cramping, went to the ED< got an xray that didn't show anything. Saw Dr Mcgrath, got an MRI, and it showed his meniscus was "flipped". Currently, he ambulating with crutches, but can get around on his own and is driving. Does get some intermittent tingling in his left foot. Has not had any falls, but has tripped to where he nedHe just graduated from [employer] in [employer], looking to get into grad school. He is currently unemployed       No past medical history on file.  Yossi James  has a past surgical history that includes repair of meniscus of knee (Left, 1/21/2019).    Yossi has a current medication list which includes the following prescription(s): hydrocodone-acetaminophen and naproxen, and the following Facility-Administered Medications: cefazolin 2 g/50ml dextrose ivpb.    Review of patient's allergies indicates:  No Known Allergies     Imaging, MRI studies  Impression       Complex tear of the posterior horn of the " lateral meniscus with flipped fragment extending anteriorly.  Moderate joint effusion.       Prior Therapy: none  Social History:  lives with their family [facility] one step to enter  Occupation: unemployed  Prior Level of Function: Independent  Current Level of Function: modified independent with ambulation; independent with all ADLs    Pain:  Current 2/10, worst 5/10, best 0/10   Location: left knee   Description: Dull  Aggravating Factors: weightbearing  Easing Factors: elevation    Pts goals: to walk again; get back to the gym/recreational/running    Objective     Posture: standing with LLE NWB with B axillary crutches  Palpation: (+) tenderness L knee joint line, ITB, distal hamstrings, proximal gastrocs    AROM: *denotes pain  left knee: 20*-83*    PROM: *denotes pain  left knee: 15*-88*        Hip Right Left Pain/Dysfunction with Movement   AROM      flexion  WNL  WNL 15* lag with SLR   extension  WNL  WNL    abduction  WNL  WNL      Knee Right Left Pain/Dysfunction with Movement   AROM/PROM      flexion  WNL  83/88 endrange pain L   extension  WNL  20/15 endrange pain L     Ankle Right Left Pain/Dysfunction with Movement   AROM      dorsiflexion  WNL  WNL    plantarflexion WNL  WNL      L/E MMT Right Left Pain/Dysfunction with Movement   Hip Flexion 5/5 3+/5    Hip Extension 5/5 4+/5    Hip Abduction 5/5 4+/5    Knee Flexion 5/5 NT    Knee Extension 5/5 NT    Ankle DF 5/5 5/5    Ankle PF 5/5 NT      Joint Mobility: L patella glides hypomobile    Flexibility: B hamstrings limited    Gait Analysis: with B axillary crutches: initially NWB LLE demonstrating 3 point gait pattern    Girth - Knee Right Left   Superior patella` 39 cm 43 cm   Joint Line 37 cm 40.5 cm   Tibial tubercle 35 cm 37 cm       CMS Impairment/Limitation/Restriction for FOTO Knee Survey    Therapist reviewed FOTO scores for Yossi James on 2/1/2019.   FOTO documents entered into [employer] - see Media section.    Limitation Score: 72%    "    TREATMENT     Treatment Time In: 0930  Treatment Time Out: 1005  Total Treatment time separate from Evaluation: 35 minutes    Yossi received therapeutic exercises to develop ROM and flexibility for 15 minutes including:    L gastroc stretch c/ strap  30"x3  L hamstring stretch in long sitting 30"x3  L QS c/ towel at ankle 5"x10  L heel slide c/ strap 5" hold x 10  L LAQ in sitting 1x10      Yossi participated in gait training to improve functional mobility and safety for 10  minutes, including:    Modified 3 point gait pattern with B axillary crutches with LLE PWB with focus on correct weightbearing LLE in stance phase, correct AD technique, improving heel strike and step length B with fair carryover with verbal cueing.     Yossi received cold pack for L knee in long sitting to decrease edema and inflammation x 10 minutes.    Home Exercises and Patient Education Provided    Education provided:   - weightbearing status PWB  - PWB LLE with ambulation  - Plan of care  - HEP    Written Home Exercises Provided: yes.  Exercises were reviewed and Yossi was able to demonstrate them prior to the end of the session.  Yossi demonstrated good  understanding of the education provided.     See EMR under Patient Instructions for exercises provided 2/1/2019.    Assessment     Yossi is a 23 y.o. male referred to outpatient Physical Therapy with a medical diagnosis of L knee meniscal tear s/p repair. Pt presents to PT with pain, decreased left knee ROM, decreased strength, poor posture, impaired balance gait, and functional deficits with walking. Pt would benefit from skilled PT consisting of manual therapy including STM/MFR left  knee, patellar mobs, knee flex/ext mobs/stretching; therapeutic exercise including LE strengthening/stretching, postural education, balance and gait training, and modalities prn to address limitations and increase functional mobility.      Pt prognosis is Excellent.   Pt will benefit from " skilled outpatient Physical Therapy to address the deficits stated above and in the chart below, provide pt/family education, and to maximize pt's level of independence.     Plan of care discussed with patient: Yes  Pt's spiritual, cultural and educational needs considered and patient is agreeable to the plan of care and goals as stated below:     Anticipated Barriers for therapy: none    Medical Necessity is demonstrated by the following  History  Co-morbidities and personal factors that may impact the plan of care Co-morbidities:   none    Personal Factors:   no deficits     low   Examination  Body Structures and Functions, activity limitations and participation restrictions that may impact the plan of care Body Regions:   lower extremities    Body Systems:    gross symmetry  ROM  strength  gait  transfers    Participation Restrictions:   none    Activity limitations:   Learning and applying knowledge  no deficits    General Tasks and Commands  no deficits    Communication  no deficits    Mobility  walking  moving around using equipment (WC)    Self care  no deficits    Domestic Life  no deficits    Interactions/Relationships  no deficits    Life Areas  higher education    Community and Social Life  recreation and leisure         high   Clinical Presentation stable and uncomplicated low   Decision Making/ Complexity Score: low     Goals:  Short Term Goals:    1. Pt will be independent with HEP to improve overall ROM and ambulation during functional tasks  2.. Pt will improve L knee AROM to equal R knee AROM  in order to improve gait    Long Term Goals:  1. Pt will be independent with updated HEP to return to full recreational activities  2. Pt will improve L LE strength to at least 4+/5  to improve functional mobility  3. Pt will improve FOTO knee survey score to </= 10% limited in order to demo improved functional mobility  4. Patient will return to full recreational/gym activities without limitation or  pain.          Plan     Plan of care Certification: 2/1/2019 to 4/26/2019.    Outpatient Physical Therapy 3 times weekly for 12 weeks to include the following interventions: Gait Training, Manual Therapy, Moist Heat/ Ice, Neuromuscular Re-ed, Orthotic Management and Training, Patient Education, Therapeutic Activites and Therapeutic Exercise.     Wai Medina Jr, PT, DPT

## 2019-02-04 ENCOUNTER — CLINICAL SUPPORT (OUTPATIENT)
Dept: REHABILITATION | Facility: HOSPITAL | Age: 24
End: 2019-02-04
Payer: COMMERCIAL

## 2019-02-04 DIAGNOSIS — R26.2 DIFFICULTY WALKING: ICD-10-CM

## 2019-02-04 DIAGNOSIS — R53.1 DECREASED STRENGTH: ICD-10-CM

## 2019-02-04 DIAGNOSIS — M25.562 LEFT KNEE PAIN, UNSPECIFIED CHRONICITY: ICD-10-CM

## 2019-02-04 DIAGNOSIS — R26.89 DECREASED FUNCTIONAL MOBILITY: ICD-10-CM

## 2019-02-04 DIAGNOSIS — M25.662 DECREASED RANGE OF MOTION OF LEFT KNEE: ICD-10-CM

## 2019-02-04 PROCEDURE — 97110 THERAPEUTIC EXERCISES: CPT | Mod: PN

## 2019-02-04 PROCEDURE — 97140 MANUAL THERAPY 1/> REGIONS: CPT | Mod: PN

## 2019-02-04 NOTE — PROGRESS NOTES
"  Physical Therapy Daily Treatment Note     Name: Yossi James  Clinic Number: [id number]    Therapy Diagnosis:   Encounter Diagnoses   Name Primary?    Left knee pain, unspecified chronicity     Decreased range of motion of left knee     Difficulty walking     Decreased functional mobility     Decreased strength      Physician: Hubert Mcgrath MD    Visit Date: 2/4/2019    Physician Orders: PT Eval and Treat PWB LLE  Medical Diagnosis from Referral: L meniscus tear  Evaluation Date: 2/1/2019  Authorization Period Expiration: 12/31/2019  Plan of Care Expiration: 2/1/2019 to 4/26/2019  Visit # / Visits authorized: 2/ 30  FOTO: 2/10     Time In: 1000  Time Out: 1105  Total Billable Time: 55 minutes (Manual Therapy - 1Therapeutic exercise -3)     Precautions: Standard, PWB LLE      Subjective     Pt reports: bending the knee is still painful 6/10 in the back of the knee  He was compliant with home exercise program.  Response to previous treatment: no adverse reactions  Functional change: walking with the crutches is easier    Pain: 3/10  Location: left knee      Objective     Yossi received the following manual therapy techniques: Myofacial release and Soft tissue Mobilization were applied to the: L knee  for 10 minutes, including:    L patella mobs all directions grade III  Edema reduction  L distal hamstring splaying  L proximal gastroc splaying    Yossi received therapeutic exercises to develop strength, endurance, ROM and flexibility for 45 minutes including:    Bike x 6' for ROM (partial revolutions)  L QS c/ towel at ankle 5"x20  L SAQ 2" hold 2x10  L SLR 2x10  L hip abduction in RSL 2x10  L heel slide c/ strap 5" hold x 10  B adducto isometric 5"x15  L LAQ in sitting 2x10  L hip flexion in sitting 2x10  L steamboats  RTB 2x10    L knee AROM 0-103  L knee PROM 0-108    Home Exercises Provided and Patient Education Provided     Education provided:   - improved ROM   - use of manual technique for edema " reduction    Written Home Exercises Provided: Patient instructed to cont prior HEP.  Exercises were reviewed and Yossi was able to demonstrate them prior to the end of the session.  Yossi demonstrated good  understanding of the education provided.     See EMR under Patient Instructions for exercises provided initial evaluation.    Assessment     Patient demonstrated improved more fluid and safe modified 3 point gait pattern ambulating in and around the gym with B axillary crutches during the treatment session. Patient with considerable L quad muscular juttering with concentric and eccentric contractions during SAQs and LAQs. Patient required verbal cueing for technique to promote improved knee flexion. Patient did demonstrated improved AROM and PROM flexion compared to the measurements at the evaluation.     Yossi is progressing well towards his goals.   Pt prognosis is Excellent.     Pt will continue to benefit from skilled outpatient physical therapy to address the deficits listed in the problem list box on initial evaluation, provide pt/family education and to maximize pt's level of independence in the home and community environment.     Pt's spiritual, cultural and educational needs considered and pt agreeable to plan of care and goals.    Anticipated barriers to physical therapy: none    Goals:  Short Term Goals:   1. Pt will be independent with HEP to improve overall ROM and ambulation during functional tasks  (PROGRESSING, NOT MET)  2.. Pt will improve L knee AROM to equal R knee AROM  in order to improve gait  (PROGRESSING, NOT MET)   Long Term Goals:  1. Pt will be independent with updated HEP to return to full recreational activities  (PROGRESSING, NOT MET)  2. Pt will improve L LE strength to at least 4+/5  to improve functional mobility.  (PROGRESSING, NOT MET)  3. Pt will improve FOTO knee survey score to </= 10% limited in order to demo improved functional mobility  (PROGRESSING, NOT MET)  4.  Patient will return to full recreational/gym activities without limitation or pain.  (PROGRESSING, NOT MET)      Plan     Continue with PT POC with emphasis on improved L knee ROM/strength.    Wai Medina Jr, PT

## 2019-02-07 ENCOUNTER — CLINICAL SUPPORT (OUTPATIENT)
Dept: REHABILITATION | Facility: HOSPITAL | Age: 24
End: 2019-02-07
Payer: COMMERCIAL

## 2019-02-07 DIAGNOSIS — M25.662 DECREASED RANGE OF MOTION OF LEFT KNEE: ICD-10-CM

## 2019-02-07 DIAGNOSIS — R26.2 DIFFICULTY WALKING: ICD-10-CM

## 2019-02-07 DIAGNOSIS — R53.1 DECREASED STRENGTH: ICD-10-CM

## 2019-02-07 DIAGNOSIS — M25.562 LEFT KNEE PAIN, UNSPECIFIED CHRONICITY: ICD-10-CM

## 2019-02-07 DIAGNOSIS — R26.89 DECREASED FUNCTIONAL MOBILITY: ICD-10-CM

## 2019-02-07 PROCEDURE — 97110 THERAPEUTIC EXERCISES: CPT | Mod: PN

## 2019-02-07 NOTE — PROGRESS NOTES
"  Physical Therapy Daily Treatment Note     Name: Yossi James  Clinic Number: [id number]    Therapy Diagnosis:   1. Left knee pain, unspecified chronicity     2. Decreased range of motion of left knee     3. Difficulty walking     4. Decreased functional mobility     5. Decreased strength         Physician: Hubert Mcgrath MD    Visit Date: 2/7/2019    Physician Orders: PT Eval and Treat PWB LLE  Medical Diagnosis from Referral: L meniscus tear  Evaluation Date: 2/1/2019  Authorization Period Expiration: 12/31/2019  Plan of Care Expiration: 2/1/2019 to 4/26/2019  Visit # / Visits authorized: 3/ 30  FOTO: 3/10     Time In: 1155  Time Out: 1305  Total Billable Time: 60 minutes (Therapeutic exercise -4)     Precautions: Standard, PWB LLE      Subjective     Pt reports: the flexion of his knee continues to get easier  He was compliant with home exercise program.  Response to previous treatment: able to rosa his knee with less pain   Functional change: walking with the crutches is easier    Pain: 2/10  Location: left knee      Objective       Yossi received therapeutic exercises to develop strength, endurance, ROM and flexibility for 60 minutes including:    Bike x 10' for ROM (full)  L QS c/ towel at ankle 5"x20  L SAQ 3" hold 2x10  L SLR 2x10  Bridging 2x10  B prone hip extension 2x10 each  L hip abduction in RSL 3x10  L heel slide c/ strap 5" hold x 15  B adducton isometric 5"x20  L LAQ in sitting  2" hold 2x10  L hip flexion in sitting 3x10  L steamboats  RTB 2x10    Yossi received a cold pack to L anterior and posteiror knee in sitting to decrease edema and inflammation x 10'    L knee AROM 0-115  L knee PROM 0-120    Home Exercises Provided and Patient Education Provided     Education provided:   - continuing with HEP< increase heel slides to 15 reps    Written Home Exercises Provided: Patient instructed to cont prior HEP.  Exercises were reviewed and Yossi was able to demonstrate them prior to the end of " the session.  Yossi demonstrated good  understanding of the education provided.     See EMR under Patient Instructions for exercises provided initial evaluation.    Assessment     Patient continues to progress as anticipated with left knee range of motion. Patient with decreased quad juttering with SAQs and LAQs during eccentric lowering.     Yossi is progressing well towards his goals.   Pt prognosis is Excellent.     Pt will continue to benefit from skilled outpatient physical therapy to address the deficits listed in the problem list box on initial evaluation, provide pt/family education and to maximize pt's level of independence in the home and community environment.     Pt's spiritual, cultural and educational needs considered and pt agreeable to plan of care and goals.    Anticipated barriers to physical therapy: none    Goals:  Short Term Goals:   1. Pt will be independent with HEP to improve overall ROM and ambulation during functional tasks  (PROGRESSING, NOT MET)  2.. Pt will improve L knee AROM to equal R knee AROM  in order to improve gait  (PROGRESSING, NOT MET)   Long Term Goals:  1. Pt will be independent with updated HEP to return to full recreational activities  (PROGRESSING, NOT MET)  2. Pt will improve L LE strength to at least 4+/5  to improve functional mobility.  (PROGRESSING, NOT MET)  3. Pt will improve FOTO knee survey score to </= 10% limited in order to demo improved functional mobility  (PROGRESSING, NOT MET)  4. Patient will return to full recreational/gym activities without limitation or pain.  (PROGRESSING, NOT MET)      Plan     Continue with PT POC with emphasis on improved L knee ROM/strength.    Wai Medina Jr, PT

## 2019-02-11 ENCOUNTER — CLINICAL SUPPORT (OUTPATIENT)
Dept: REHABILITATION | Facility: HOSPITAL | Age: 24
End: 2019-02-11
Payer: COMMERCIAL

## 2019-02-11 DIAGNOSIS — R26.2 DIFFICULTY WALKING: ICD-10-CM

## 2019-02-11 DIAGNOSIS — R26.89 DECREASED FUNCTIONAL MOBILITY: ICD-10-CM

## 2019-02-11 DIAGNOSIS — M25.662 DECREASED RANGE OF MOTION OF LEFT KNEE: ICD-10-CM

## 2019-02-11 DIAGNOSIS — M25.562 LEFT KNEE PAIN, UNSPECIFIED CHRONICITY: ICD-10-CM

## 2019-02-11 DIAGNOSIS — R53.1 DECREASED STRENGTH: ICD-10-CM

## 2019-02-11 PROCEDURE — 97110 THERAPEUTIC EXERCISES: CPT | Mod: PN

## 2019-02-11 NOTE — PROGRESS NOTES
"  Physical Therapy Daily Treatment Note     Name: Yossi James  Clinic Number: [id number]    Therapy Diagnosis:   1. Left knee pain, unspecified chronicity     2. Decreased range of motion of left knee     3. Difficulty walking     4. Decreased functional mobility     5. Decreased strength         Physician: Hubert Mcgrath MD    Visit Date: 2/11/2019    Physician Orders: PT Eval and Treat PWB LLE  Medical Diagnosis from Referral: L meniscus tear  Evaluation Date: 2/1/2019  Authorization Period Expiration: 12/31/2019  Plan of Care Expiration: 2/1/2019 to 4/26/2019  Visit # / Visits authorized: 4/ 30  FOTO: 4/10     Time In: 1030  Time Out: 1130  Total Billable Time: 60 minutes (Therapeutic exercise -4)     Precautions: Standard, PWB LLE      Subjective     Pt reports: not having any pain so he stopped using his crutches  He was compliant with home exercise program.  Response to previous treatment: improved ROM  Functional change: none    Pain: 0/10  Location: left knee      Objective     Patient arrived to clinic ambulating without an AD    Yossi received therapeutic exercises to develop strength, endurance, ROM and flexibility for 60 minutes including:    Bike x 10' for ROM (full)  L SAQ 3" hold 3x10 2#  L SLR 2x10 2#  Bridging 3x10  B prone hip extension 3x10 each  L hip abduction in RSL 3x10 2#  L heel slide c/ strap 5" hold x 20  B adducton isometric 5"x20  L LAQ in sitting  2" hold 3x10  L hip flexion in sitting 3x10  L steamboats  GTB 3x10      L knee AROM 0-115  L knee PROM 0-120    Home Exercises Provided and Patient Education Provided     Education provided:   - strongly educated to comply with weightbearing precautions per MD orders. Noncompliance makes him at risk for re-injury    Written Home Exercises Provided: Patient instructed to cont prior HEP.  Exercises were reviewed and Yossi was able to demonstrate them prior to the end of the session.  Yosis demonstrated good  understanding of the " education provided.     See EMR under Patient Instructions for exercises provided initial evaluation.    Assessment     Patient demonstrate improved L knee strength as evidenced by good tolerance to increased resistance with therapeutic exercise     Yossi is progressing well towards his goals.   Pt prognosis is Excellent.     Pt will continue to benefit from skilled outpatient physical therapy to address the deficits listed in the problem list box on initial evaluation, provide pt/family education and to maximize pt's level of independence in the home and community environment.     Pt's spiritual, cultural and educational needs considered and pt agreeable to plan of care and goals.    Anticipated barriers to physical therapy: Weightbearing precaution compliance    Goals:  Short Term Goals:   1. Pt will be independent with HEP to improve overall ROM and ambulation during functional tasks  (PROGRESSING, NOT MET)  2.. Pt will improve L knee AROM to equal R knee AROM  in order to improve gait  (PROGRESSING, NOT MET)   Long Term Goals:  1. Pt will be independent with updated HEP to return to full recreational activities  (PROGRESSING, NOT MET)  2. Pt will improve L LE strength to at least 4+/5  to improve functional mobility.  (PROGRESSING, NOT MET)  3. Pt will improve FOTO knee survey score to </= 10% limited in order to demo improved functional mobility  (PROGRESSING, NOT MET)  4. Patient will return to full recreational/gym activities without limitation or pain.  (PROGRESSING, NOT MET)      Plan     Continue with PT POC with emphasis on improved L knee ROM/strength.    Wai Medina Jr, PT

## 2019-02-19 ENCOUNTER — CLINICAL SUPPORT (OUTPATIENT)
Dept: REHABILITATION | Facility: HOSPITAL | Age: 24
End: 2019-02-19
Payer: COMMERCIAL

## 2019-02-19 DIAGNOSIS — M25.662 DECREASED RANGE OF MOTION OF LEFT KNEE: ICD-10-CM

## 2019-02-19 DIAGNOSIS — R26.89 DECREASED FUNCTIONAL MOBILITY: ICD-10-CM

## 2019-02-19 DIAGNOSIS — R53.1 DECREASED STRENGTH: ICD-10-CM

## 2019-02-19 DIAGNOSIS — R26.2 DIFFICULTY WALKING: ICD-10-CM

## 2019-02-19 DIAGNOSIS — M25.562 LEFT KNEE PAIN, UNSPECIFIED CHRONICITY: ICD-10-CM

## 2019-02-19 PROCEDURE — 97110 THERAPEUTIC EXERCISES: CPT | Mod: PN

## 2019-02-19 NOTE — PROGRESS NOTES
"  Physical Therapy Daily Treatment Note     Name: Yossi James  Clinic Number: [id number]    Therapy Diagnosis:   1. Left knee pain, unspecified chronicity     2. Decreased range of motion of left knee     3. Difficulty walking     4. Decreased functional mobility     5. Decreased strength         Physician: Hubert Mcgrath MD    Visit Date: 2/19/2019    Physician Orders: PT Eval and Treat PWB LLE  Medical Diagnosis from Referral: L meniscus tear  Evaluation Date: 2/1/2019  Authorization Period Expiration: 12/31/2019  Plan of Care Expiration: 2/1/2019 to 4/26/2019  Visit # / Visits authorized: 5/ 30  FOTO: 5/10     Time In: 1033  Time Out: 1130  Total Billable Time: 57 minutes (Therapeutic exercise -4)     Precautions: Standard, PWB LLE    Subjective     Pt reports: accidentally got kicked in his left knee yesterday helping my little brother get in the car. See Dr Mcgrath on Thursday.   He was compliant with home exercise program.  Response to previous treatment: improved ROM  Functional change: none    Pain: 0/10  Location: left knee      Objective     Yossi received therapeutic exercises to develop strength, endurance, ROM and flexibility for 60 minutes including:    Bike x 10' for ROM (full)  L QS c/ towel at ankle 10"x20  L SAQ 5" hold 2x10 3#  L SLR 3x10 3#  Bridging c/ B hip isometric  3x10  L prone hip extension 3x10 3#  L hip abduction in RSL 3x10 3#  L LAQ in sitting  3" hold 3x10 3#  L hip flexion in sitting 3x10 3#  L steamboats BTB  2x15  L TKE with pick Mend[place]-Cook band 2x15       L knee AROM 0-135  L knee PROM 0-140    CMS Impairment/Limitation/Restriction for FOTO Knee Survey    Therapist reviewed FOTO scores for Yossi James on 2/19/2019.   FOTO documents entered into [employer] - see Media section.    Limitation Score: 51%           Home Exercises Provided and Patient Education Provided     Education provided:   - get weightbearing progression in writing from the MD on 2/2/19    Written Home Exercises " Provided: Patient instructed to cont prior HEP.  Exercises were reviewed and Yossi was able to demonstrate them prior to the end of the session.  Yossi demonstrated good  understanding of the education provided.     See EMR under Patient Instructions for exercises provided initial evaluation.    Assessment     Patient ambulating within weightbearing precaution compliance this visit. Improved quad contraction observed with less muscle juttering with all therex noted this date.    Yossi is progressing well towards his goals.   Pt prognosis is Excellent.     Pt will continue to benefit from skilled outpatient physical therapy to address the deficits listed in the problem list box on initial evaluation, provide pt/family education and to maximize pt's level of independence in the home and community environment.     Pt's spiritual, cultural and educational needs considered and pt agreeable to plan of care and goals.    Anticipated barriers to physical therapy: Weightbearing precaution compliance    Goals:  Short Term Goals:   1. Pt will be independent with HEP to improve overall ROM and ambulation during functional tasks  (PROGRESSING, NOT MET)  2.. Pt will improve L knee AROM to equal R knee AROM  in order to improve gait  (PROGRESSING, NOT MET)   Long Term Goals:  1. Pt will be independent with updated HEP to return to full recreational activities  (PROGRESSING, NOT MET)  2. Pt will improve L LE strength to at least 4+/5  to improve functional mobility.  (PROGRESSING, NOT MET)  3. Pt will improve FOTO knee survey score to </= 10% limited in order to demo improved functional mobility  (PROGRESSING, NOT MET)  4. Patient will return to full recreational/gym activities without limitation or pain.  (PROGRESSING, NOT MET)      Plan     Continue with PT POC with emphasis on improved L knee ROM/strength.    Wai Medina Jr, PT

## 2019-02-22 ENCOUNTER — CLINICAL SUPPORT (OUTPATIENT)
Dept: REHABILITATION | Facility: HOSPITAL | Age: 24
End: 2019-02-22
Payer: COMMERCIAL

## 2019-02-22 DIAGNOSIS — R26.2 DIFFICULTY WALKING: ICD-10-CM

## 2019-02-22 DIAGNOSIS — R26.89 DECREASED FUNCTIONAL MOBILITY: ICD-10-CM

## 2019-02-22 DIAGNOSIS — M25.662 DECREASED RANGE OF MOTION OF LEFT KNEE: ICD-10-CM

## 2019-02-22 DIAGNOSIS — M25.562 LEFT KNEE PAIN, UNSPECIFIED CHRONICITY: ICD-10-CM

## 2019-02-22 DIAGNOSIS — R53.1 DECREASED STRENGTH: ICD-10-CM

## 2019-02-22 PROCEDURE — 97110 THERAPEUTIC EXERCISES: CPT | Mod: PN

## 2019-02-22 NOTE — PROGRESS NOTES
Physical Therapy Daily Treatment Note     Name: Yossi James  Clinic Number: [id number]    Therapy Diagnosis:   1. Left knee pain, unspecified chronicity     2. Decreased range of motion of left knee     3. Difficulty walking     4. Decreased functional mobility     5. Decreased strength         Physician: Hubert Mcgrath MD    Visit Date: 2/22/2019    Physician Orders: PT Eval and Treat PWB LLE  Medical Diagnosis from Referral: L meniscus tear  Evaluation Date: 2/1/2019  Authorization Period Expiration: 12/31/2019  Plan of Care Expiration: 2/1/2019 to 4/26/2019  Visit # / Visits authorized: 6/ 30  FOTO: 6/10     Time In: 1035  Time Out: 1130  Total Billable Time: 55 minutes (Therapeutic exercise -4)     Precautions: Standard, PWB LLE    Subjective     Pt reports: now full bearing  (written confirmation - see media)  He was compliant with home exercise program.  Response to previous treatment: no adverse reactions  Functional change: ambulating without the crutches    Pain: 0/10  Location: left knee        Objective     Yossi received therapeutic exercises to develop strength, endurance, ROM and flexibility for 55 minutes including:    Bike x 10' for ROM (full)  Cybex Leg Press DL 7 plates 3x10  Cybex Knee Extension 40# DL 3x10  B steamboats BTB  2x15  L step ups (12 inch step) 3x10  L step downs (6 inch step) 2x10  L TKE with maroon [place]-Cook band 3x10  DL squats 2x10  Lunges on Bosu 3x10 L          Home Exercises Provided and Patient Education Provided     Education provided:   - weight, sets, and reps on Leg Press, Knee ext, squats, and lunges performed today to be re-established at the gym   - Blue theraband for steamboats    Written Home Exercises Provided: Patient instructed to cont prior HEP.  Exercises were reviewed and Yossi was able to demonstrate them prior to the end of the session.  Yossi demonstrated good  understanding of the education provided.    See EMR under Patient Instructions for  exercises provided initial evaluation.       Assessment     Quad fatigue with muscle quivering with eccentric loading.     Yossi is progressing well towards his goals.   Pt prognosis is Excellent.     Pt will continue to benefit from skilled outpatient physical therapy to address the deficits listed in the problem list box on initial evaluation, provide pt/family education and to maximize pt's level of independence in the home and community environment.     Pt's spiritual, cultural and educational needs considered and pt agreeable to plan of care and goals.    Anticipated barriers to physical therapy: Weightbearing precaution compliance    Goals:  Short Term Goals:   1. Pt will be independent with HEP to improve overall ROM and ambulation during functional tasks  (PROGRESSING, NOT MET)  2.. Pt will improve L knee AROM to equal R knee AROM  in order to improve gait  (PROGRESSING, NOT MET)   Long Term Goals:  1. Pt will be independent with updated HEP to return to full recreational activities  (PROGRESSING, NOT MET)  2. Pt will improve L LE strength to at least 4+/5  to improve functional mobility.  (PROGRESSING, NOT MET)  3. Pt will improve FOTO knee survey score to </= 10% limited in order to demo improved functional mobility  (PROGRESSING, NOT MET)  4. Patient will return to full recreational/gym activities without limitation or pain.  (PROGRESSING, NOT MET)      Plan     Continue with PT POC with emphasis on improved L knee ROM/strength.    Wai Medina Jr, PT

## 2019-02-25 ENCOUNTER — CLINICAL SUPPORT (OUTPATIENT)
Dept: REHABILITATION | Facility: HOSPITAL | Age: 24
End: 2019-02-25
Payer: COMMERCIAL

## 2019-02-25 DIAGNOSIS — M25.562 LEFT KNEE PAIN, UNSPECIFIED CHRONICITY: ICD-10-CM

## 2019-02-25 DIAGNOSIS — R26.89 DECREASED FUNCTIONAL MOBILITY: ICD-10-CM

## 2019-02-25 DIAGNOSIS — R26.2 DIFFICULTY WALKING: ICD-10-CM

## 2019-02-25 DIAGNOSIS — R53.1 DECREASED STRENGTH: ICD-10-CM

## 2019-02-25 DIAGNOSIS — M25.662 DECREASED RANGE OF MOTION OF LEFT KNEE: ICD-10-CM

## 2019-02-25 PROCEDURE — 97110 THERAPEUTIC EXERCISES: CPT | Mod: PN

## 2019-03-07 ENCOUNTER — CLINICAL SUPPORT (OUTPATIENT)
Dept: REHABILITATION | Facility: HOSPITAL | Age: 24
End: 2019-03-07
Payer: COMMERCIAL

## 2019-03-07 DIAGNOSIS — M25.562 LEFT KNEE PAIN, UNSPECIFIED CHRONICITY: ICD-10-CM

## 2019-03-07 DIAGNOSIS — R26.89 DECREASED FUNCTIONAL MOBILITY: ICD-10-CM

## 2019-03-07 DIAGNOSIS — R26.2 DIFFICULTY WALKING: ICD-10-CM

## 2019-03-07 DIAGNOSIS — M25.662 DECREASED RANGE OF MOTION OF LEFT KNEE: ICD-10-CM

## 2019-03-07 DIAGNOSIS — R53.1 DECREASED STRENGTH: ICD-10-CM

## 2019-03-07 PROCEDURE — 97110 THERAPEUTIC EXERCISES: CPT | Mod: PN

## 2019-03-07 NOTE — PROGRESS NOTES
Physical Therapy Daily Treatment Note     Name: Yossi James  Clinic Number: [id number]    Therapy Diagnosis:   1. Left knee pain, unspecified chronicity     2. Decreased range of motion of left knee     3. Difficulty walking     4. Decreased functional mobility     5. Decreased strength         Physician: Hubert Mcgrath MD    Visit Date: 3/7/2019    Physician Orders: PT Eval and Treat PWB LLE  Medical Diagnosis from Referral: L meniscus tear  Evaluation Date: 2/1/2019  Authorization Period Expiration: 12/31/2019  Plan of Care Expiration: 2/1/2019 to 4/26/2019  Visit # / Visits authorized: 8/ 30  FOTO: 8/10     Time In: 1055  Time Out: 1150  Total Billable Time: 55 minutes (Therapeutic exercise -4)     Precautions: Standard    Subjective     Pt reports: mild stiffness in the back of his knee  : He was compliant with home exercise program.  Response to previous treatment: sore the day after  Functional change: none    Pain: 0/10  Location: left knee        Objective     Yossi received therapeutic exercises to develop strength, endurance, ROM and flexibility for 55 minutes including:    Bike x 5' for ROM (full)  Cybex Leg Press DL 8 plates 3x10  Cybex Knee Extension 50# DL 3x10  B steamboats Black TheraBand  x30  L step ups (12 inch step) 2x15  L step downs (6 inch step) 2x15  Shuttle leg press L 2 bands 3x10  Shuttle Leg Press Sidelying 2 bands 2x10 L   DL squats on bosu  3x10  Lunges on Bosu 3x10 L  DL Squats with orange [place] Cook Band 2x10      Home Exercises Provided and Patient Education Provided     Education provided:   - continuing with gym routine    Written Home Exercises Provided: Patient instructed to cont prior HEP.  Exercises were reviewed and Yossi was able to demonstrate them prior to the end of the session.  Yossi demonstrated good  understanding of the education provided.    See EMR under Patient Instructions for exercises provided initial evaluation.       Assessment     Patient continues  to progress with quad strengthening as evidenced by good tolerance to increased intensity of plan of care.     Yossi is progressing well towards his goals.   Pt prognosis is Excellent.     Pt will continue to benefit from skilled outpatient physical therapy to address the deficits listed in the problem list box on initial evaluation, provide pt/family education and to maximize pt's level of independence in the home and community environment.     Pt's spiritual, cultural and educational needs considered and pt agreeable to plan of care and goals.    Anticipated barriers to physical therapy: Weightbearing precaution compliance    Goals:  Short Term Goals:   1. Pt will be independent with HEP to improve overall ROM and ambulation during functional tasks  (PROGRESSING, NOT MET)  2.. Pt will improve L knee AROM to equal R knee AROM  in order to improve gait  (PROGRESSING, NOT MET)   Long Term Goals:  1. Pt will be independent with updated HEP to return to full recreational activities  (PROGRESSING, NOT MET)  2. Pt will improve L LE strength to at least 4+/5  to improve functional mobility.  (PROGRESSING, NOT MET)  3. Pt will improve FOTO knee survey score to </= 10% limited in order to demo improved functional mobility  (PROGRESSING, NOT MET)  4. Patient will return to full recreational/gym activities without limitation or pain.  (PROGRESSING, NOT MET)      Plan     Continue with PT POC with emphasis on improved L knee ROM/strength.    Wai Medina Jr, PT

## 2019-03-14 ENCOUNTER — CLINICAL SUPPORT (OUTPATIENT)
Dept: REHABILITATION | Facility: HOSPITAL | Age: 24
End: 2019-03-14
Payer: COMMERCIAL

## 2019-03-14 DIAGNOSIS — M25.662 DECREASED RANGE OF MOTION OF LEFT KNEE: ICD-10-CM

## 2019-03-14 DIAGNOSIS — R26.2 DIFFICULTY WALKING: ICD-10-CM

## 2019-03-14 DIAGNOSIS — R26.89 DECREASED FUNCTIONAL MOBILITY: ICD-10-CM

## 2019-03-14 DIAGNOSIS — R53.1 DECREASED STRENGTH: ICD-10-CM

## 2019-03-14 DIAGNOSIS — M25.562 LEFT KNEE PAIN, UNSPECIFIED CHRONICITY: ICD-10-CM

## 2019-03-14 PROCEDURE — 97110 THERAPEUTIC EXERCISES: CPT | Mod: PN

## 2019-03-14 NOTE — PROGRESS NOTES
Physical Therapy Daily Treatment Note     Name: Yossi James  Clinic Number: [id number]    Therapy Diagnosis:   1. Left knee pain, unspecified chronicity     2. Decreased range of motion of left knee     3. Difficulty walking     4. Decreased functional mobility     5. Decreased strength         Physician: Hubert Mcgrath MD    Visit Date: 3/14/2019    Physician Orders: PT Eval and Treat  Medical Diagnosis from Referral: L meniscus tear  Evaluation Date: 2/1/2019  Authorization Period Expiration: 12/31/2019  Plan of Care Expiration: 2/1/2019 to 4/26/2019  Visit # / Visits authorized: 9/ 30  FOTO: 9/10     Time In: 1140  Time Out: 1225  Total Billable Time: 45 minutes (Therapeutic exercise -3)     Precautions: Standard    Subjective     Pt reports: 90% improved.   : He was compliant with home exercise program.  Functional change: no issues at the gym     Pain: 0/10  Location: left knee        Objective     Yossi received therapeutic exercises to develop strength, endurance, ROM and flexibility for 45 minutes including:    Bike x 5' for ROM (full)  Cybex Leg Press DL 10 plates 3x10  Cybex Knee Extension 65# DL 3x10,  30# L 2x10   L step downs (6 inch step) 2x15  Shuttle leg press DL 4 bands (100#) 3x10  Shuttle leg press L 3 bands (75#) 3x10  Shuttle Leg Press Sidelying 3 bands (75#), 3x10 L   DL squats on bosu  3x10  Lunges on Bosu 3x10 L  Lateral Squats Walk 90 feet c/ Blue theraband  Monster walks 90 feet with blue theraband      L/E Strength w/ MicroFET Muscle Melecio Dynamometer Right Left Pain/Dysfunction with Movement   (approx 4 sec hold w/ max contraction)   Hip Flexion 23.4 kg  24.7 kg     Hip Abduction 28.5 kg  30.5 kg     Quadriceps 24.5 kg  23.4 kg     Hamstrings 24.5 kg  25.5 kg           Home Exercises Provided and Patient Education Provided     Education provided:   - continuing with gym routine    Written Home Exercises Provided: Patient instructed to cont prior HEP.  Exercises were reviewed and  Yossi was able to demonstrate them prior to the end of the session.  Yossi demonstrated good  understanding of the education provided.    See EMR under Patient Instructions for exercises provided initial evaluation.       Assessment     L quad strength deficits minimal when compared to the R quad. Patient is emerging on max rehab potential .    Yossi is progressing well towards his goals.   Pt prognosis is Excellent.     Pt will continue to benefit from skilled outpatient physical therapy to address the deficits listed in the problem list box on initial evaluation, provide pt/family education and to maximize pt's level of independence in the home and community environment.     Pt's spiritual, cultural and educational needs considered and pt agreeable to plan of care and goals.    Anticipated barriers to physical therapy: Weightbearing precaution compliance    Goals:  Short Term Goals:   1. Pt will be independent with HEP to improve overall ROM and ambulation during functional tasks  (PROGRESSING, NOT MET)  2.. Pt will improve L knee AROM to equal R knee AROM  in order to improve gait  (PROGRESSING, NOT MET)   Long Term Goals:  1. Pt will be independent with updated HEP to return to full recreational activities  (PROGRESSING, NOT MET)  2. Pt will improve L LE strength to at least 4+/5  to improve functional mobility.  (PROGRESSING, NOT MET)  3. Pt will improve FOTO knee survey score to </= 10% limited in order to demo improved functional mobility  (PROGRESSING, NOT MET)  4. Patient will return to full recreational/gym activities without limitation or pain.  (PROGRESSING, NOT MET)      Plan     Continue with PT POC with emphasis on improved L knee ROM/strength. Patient to continue with gym routine for next 2 weeks, will be seen for 1 additional visit on 3/28/19 for status update with most likely DC on that date.     Wai Medina Jr, PT

## 2019-03-28 ENCOUNTER — CLINICAL SUPPORT (OUTPATIENT)
Dept: REHABILITATION | Facility: HOSPITAL | Age: 24
End: 2019-03-28
Payer: COMMERCIAL

## 2019-03-28 DIAGNOSIS — M25.662 DECREASED RANGE OF MOTION OF LEFT KNEE: ICD-10-CM

## 2019-03-28 DIAGNOSIS — R53.1 DECREASED STRENGTH: ICD-10-CM

## 2019-03-28 DIAGNOSIS — R26.89 DECREASED FUNCTIONAL MOBILITY: ICD-10-CM

## 2019-03-28 DIAGNOSIS — R26.2 DIFFICULTY WALKING: ICD-10-CM

## 2019-03-28 DIAGNOSIS — M25.562 LEFT KNEE PAIN, UNSPECIFIED CHRONICITY: ICD-10-CM

## 2019-03-28 PROCEDURE — 97110 THERAPEUTIC EXERCISES: CPT | Mod: PN

## 2019-03-28 NOTE — PLAN OF CARE
Outpatient Therapy Discharge Summary     Name: Yossi James  [facility] Number: [id number]    Therapy Diagnosis:   Encounter Diagnoses   Name Primary?    Left knee pain, unspecified chronicity     Decreased range of motion of left knee     Difficulty walking     Decreased functional mobility     Decreased strength      Physician: Hubert Mcgrath MD    Physician Orders: PT eval and treat  Medical Diagnosis: L meniscus repair  Evaluation Date: 2/1/2019      Date of Last visit: 3/27/19  Total Visits Received: 10  Cancelled Visits: 1  No Show Visits: 0    Assessment      Goals:  Short Term Goals:   1. Pt will be independent with HEP to improve overall ROM and ambulation during functional tasks  (MET)  2.. Pt will improve L knee AROM to equal R knee AROM  in order to improve gait  ( MET)   Long Term Goals:  1. Pt will be independent with updated HEP to return to full recreational activities  (MET)  2. Pt will improve L LE strength to at least 4+/5  to improve functional mobility.  (MET)  3. Pt will improve FOTO knee survey score to </= 10% limited in order to demo improved functional mobility  (MET)  4. Patient will return to full recreational/gym activities without limitation or pain.  (MET)    Discharge reason: Patient has reached the maximum rehab potential for the present time    Plan   This patient is discharged from Physical Therapy

## 2019-03-28 NOTE — PROGRESS NOTES
Physical Therapy Daily Treatment Note     Name: Yossi James  Clinic Number: [id number]    Therapy Diagnosis:   1. Left knee pain, unspecified chronicity     2. Decreased range of motion of left knee     3. Difficulty walking     4. Decreased functional mobility     5. Decreased strength         Physician: Hubert Mcgrath MD    Visit Date: 3/28/2019    Physician Orders: PT Eval and Treat  Medical Diagnosis from Referral: L meniscus tear  Evaluation Date: 2/1/2019  Authorization Period Expiration: 12/31/2019  Plan of Care Expiration: 2/1/2019 to 4/26/2019  Visit # / Visits authorized: 10/ 30  FOTO: DC     Time In: 1110  Time Out: 1150  Total Billable Time:  40 minutes (Therapeutic exercise -3)     Precautions: Standard    Subjective     Pt reports: 99% improved. Going to the gym 5 days a week.   : He was compliant with home exercise program.  Functional change: no limitations functionally or recreationally    Pain: 0/10  Location: left knee        Objective     Yossi received therapeutic exercises to develop strength, endurance, ROM and flexibility for 40 minutes including:    Bike x 5' for ROM   Cybex Leg Press DL 12 plates 3x10  Cybex Knee Extension 80# DL 3x10,  30# L 3x10   Shuttle leg press DL 4 bands (100#) x30  Shuttle leg press L 3 bands (75#) x30  Shuttle Leg Press Sidelying 3 bands (75#) x30 L   DL squats on bosu  x30  Lunges on Bosu x30 L  Russian Dead lifts on LLE 2x10  L/E Strength w/ MicroFET Muscle Melecio Dynamometer Right Left Pain/Dysfunction with Movement   (approx 4 sec hold w/ max contraction)   Hip Flexion 15.4 kg  14.7 kg     Hip Abduction 21.8 kg  21.4 kg     Quadriceps 21.6 kg  19.4 kg     Hamstrings 21.4 kg  18.9 kg       CMS Impairment/Limitation/Restriction for FOTO Knee Survey    Therapist reviewed FOTO scores for Yossi James on 3/28/2019.   FOTO documents entered into [employer] - see Media section.    Limitation Score: 10%           Home Exercises Provided and Patient Education  Provided     Education provided:   - continuing with gym routine    Written Home Exercises Provided: Patient instructed to cont prior HEP.  Exercises were reviewed and Yossi was able to demonstrate them prior to the end of the session.  Yossi demonstrated good  understanding of the education provided.    See EMR under Patient Instructions for exercises provided initial evaluation.       Assessment     Patient has reached max rehab potential .    Yossi is progressing well towards his goals.   Pt prognosis is Excellent.     Pt will continue to benefit from skilled outpatient physical therapy to address the deficits listed in the problem list box on initial evaluation, provide pt/family education and to maximize pt's level of independence in the home and community environment.     Pt's spiritual, cultural and educational needs considered and pt agreeable to plan of care and goals.    Anticipated barriers to physical therapy: Weightbearing precaution compliance      Plan     Discharge from Physical Therapy    Wai Medina Jr, PT

## 2019-03-29 PROBLEM — R53.1 DECREASED STRENGTH: Status: RESOLVED | Noted: 2019-02-01 | Resolved: 2019-03-29

## 2019-03-29 PROBLEM — M25.662 DECREASED RANGE OF MOTION OF LEFT KNEE: Status: RESOLVED | Noted: 2019-02-01 | Resolved: 2019-03-29

## 2019-03-29 PROBLEM — R26.89 DECREASED FUNCTIONAL MOBILITY: Status: RESOLVED | Noted: 2019-02-01 | Resolved: 2019-03-29

## 2019-03-29 PROBLEM — R26.2 DIFFICULTY WALKING: Status: RESOLVED | Noted: 2019-02-01 | Resolved: 2019-03-29

## 2019-03-29 PROBLEM — M25.562 LEFT KNEE PAIN: Status: RESOLVED | Noted: 2019-02-01 | Resolved: 2019-03-29

## 2019-05-27 ENCOUNTER — OFFICE VISIT (OUTPATIENT)
Dept: URGENT CARE | Facility: CLINIC | Age: 24
End: 2019-05-27
Payer: COMMERCIAL

## 2019-05-27 VITALS
OXYGEN SATURATION: 100 % | HEART RATE: 82 BPM | BODY MASS INDEX: 25.06 KG/M2 | RESPIRATION RATE: 16 BRPM | TEMPERATURE: 98 F | SYSTOLIC BLOOD PRESSURE: 145 MMHG | HEIGHT: 72 IN | WEIGHT: 185 LBS | DIASTOLIC BLOOD PRESSURE: 76 MMHG

## 2019-05-27 DIAGNOSIS — J06.9 VIRAL URI WITH COUGH: Primary | ICD-10-CM

## 2019-05-27 DIAGNOSIS — R05.9 COUGH: ICD-10-CM

## 2019-05-27 PROCEDURE — 99203 OFFICE O/P NEW LOW 30 MIN: CPT | Mod: S$GLB,,, | Performed by: PHYSICIAN ASSISTANT

## 2019-05-27 PROCEDURE — 71046 XR CHEST PA AND LATERAL: ICD-10-PCS | Mod: FY,S$GLB,, | Performed by: RADIOLOGY

## 2019-05-27 PROCEDURE — 3008F PR BODY MASS INDEX (BMI) DOCUMENTED: ICD-10-PCS | Mod: CPTII,S$GLB,, | Performed by: PHYSICIAN ASSISTANT

## 2019-05-27 PROCEDURE — 3008F BODY MASS INDEX DOCD: CPT | Mod: CPTII,S$GLB,, | Performed by: PHYSICIAN ASSISTANT

## 2019-05-27 PROCEDURE — 99203 PR OFFICE/OUTPT VISIT, NEW, LEVL III, 30-44 MIN: ICD-10-PCS | Mod: S$GLB,,, | Performed by: PHYSICIAN ASSISTANT

## 2019-05-27 PROCEDURE — 71046 X-RAY EXAM CHEST 2 VIEWS: CPT | Mod: FY,S$GLB,, | Performed by: RADIOLOGY

## 2019-05-27 RX ORDER — BENZONATATE 100 MG/1
200 CAPSULE ORAL 3 TIMES DAILY PRN
Qty: 40 CAPSULE | Refills: 0 | Status: SHIPPED | OUTPATIENT
Start: 2019-05-27 | End: 2019-07-23

## 2019-05-27 RX ORDER — LEVOCETIRIZINE DIHYDROCHLORIDE 5 MG/1
5 TABLET, FILM COATED ORAL NIGHTLY
Qty: 30 TABLET | Refills: 0 | Status: SHIPPED | OUTPATIENT
Start: 2019-05-27 | End: 2019-07-23

## 2019-05-27 NOTE — PROGRESS NOTES
Subjective:       Patient ID: Yossi James is a 23 y.o. male.    Vitals:  height is 6' (1.829 m) and weight is 83.9 kg (185 lb). His oral temperature is 98.4 °F (36.9 °C). His blood pressure is 145/76 (abnormal) and his pulse is 82. His respiration is 16 and oxygen saturation is 100%.     Chief Complaint: Cough    HPI  <OUCOOHADULT>    Objective:      Physical Exam    Assessment:       No diagnosis found.    Plan:         There are no diagnoses linked to this encounter.

## 2019-05-27 NOTE — PROGRESS NOTES
Subjective:       Patient ID: Yossi James is a 23 y.o. male.    Vitals:  height is 6' (1.829 m) and weight is 83.9 kg (185 lb). His oral temperature is 98.4 °F (36.9 °C). His blood pressure is 145/76 (abnormal) and his pulse is 82. His respiration is 16 and oxygen saturation is 100%.     Chief Complaint: Cough    Cough   This is a new problem. The current episode started in the past 7 days. The problem has been unchanged. The problem occurs every few minutes. The cough is non-productive. Associated symptoms include rhinorrhea. Pertinent negatives include no chest pain, chills, ear congestion, ear pain, eye redness, fever, headaches, heartburn, hemoptysis, myalgias, nasal congestion, postnasal drip, rash, sore throat, shortness of breath, sweats, weight loss or wheezing. Nothing (mostly at night ) aggravates the symptoms. He has tried OTC cough suppressant for the symptoms. The treatment provided no relief.       Constitution: Negative for chills, sweating, fatigue and fever.   HENT: Negative for ear pain, congestion, postnasal drip, sinus pain, sinus pressure, sore throat and voice change.    Neck: Negative for painful lymph nodes.   Cardiovascular: Negative for chest pain.   Eyes: Negative for eye redness.   Respiratory: Positive for cough. Negative for chest tightness, sputum production, bloody sputum, COPD, shortness of breath, stridor, wheezing and asthma.         1 week    Gastrointestinal: Negative for nausea, vomiting and heartburn.   Musculoskeletal: Negative for muscle ache.   Skin: Negative for rash.   Allergic/Immunologic: Negative for seasonal allergies and asthma.   Neurological: Negative for headaches.   Hematologic/Lymphatic: Negative for swollen lymph nodes.       Objective:      Physical Exam   Constitutional: He is oriented to person, place, and time. He appears well-developed and well-nourished. He is cooperative.  Non-toxic appearance. He does not appear ill. No distress.   HENT:   Head:  Normocephalic and atraumatic.   Right Ear: Hearing, tympanic membrane, external ear and ear canal normal.   Left Ear: Hearing, tympanic membrane, external ear and ear canal normal.   Nose: Rhinorrhea present. No mucosal edema or nasal deformity. No epistaxis. Right sinus exhibits no maxillary sinus tenderness and no frontal sinus tenderness. Left sinus exhibits no maxillary sinus tenderness and no frontal sinus tenderness.   Mouth/Throat: Uvula is midline, oropharynx is clear and moist and mucous membranes are normal. No trismus in the jaw. Normal dentition. No uvula swelling. No posterior oropharyngeal erythema. Tonsils are 1+ on the right. Tonsils are 1+ on the left. No tonsillar exudate.   Eyes: Conjunctivae and lids are normal. No scleral icterus.   Sclera clear bilat   Neck: Trachea normal, normal range of motion, full passive range of motion without pain and phonation normal. Neck supple. No Brudzinski's sign and no Kernig's sign noted.   Cardiovascular: Normal rate, regular rhythm, normal heart sounds, intact distal pulses and normal pulses.   Pulmonary/Chest: Effort normal and breath sounds normal. No accessory muscle usage or stridor. No respiratory distress. He has no decreased breath sounds. He has no wheezes. He has no rhonchi. He has no rales.   Abdominal: Soft. Normal appearance and bowel sounds are normal. He exhibits no distension. There is no tenderness.   Musculoskeletal: Normal range of motion. He exhibits no edema or deformity.   Lymphadenopathy:        Head (right side): No submental, no submandibular, no tonsillar, no preauricular, no posterior auricular and no occipital adenopathy present.        Head (left side): No submental, no submandibular, no tonsillar, no preauricular, no posterior auricular and no occipital adenopathy present.     He has no cervical adenopathy.        Right cervical: No superficial cervical, no deep cervical and no posterior cervical adenopathy present.       Left  cervical: No superficial cervical, no deep cervical and no posterior cervical adenopathy present.   Neurological: He is alert and oriented to person, place, and time. He exhibits normal muscle tone. Coordination normal.   Skin: Skin is warm, dry and intact. He is not diaphoretic. No pallor.   Psychiatric: He has a normal mood and affect. His speech is normal and behavior is normal. Judgment and thought content normal. Cognition and memory are normal.   Nursing note and vitals reviewed.        XRAY: No acute intrathoracic processes noted    Assessment:       1. Viral URI with cough    2. Cough        Plan:         Viral URI with cough  -     benzonatate (TESSALON PERLES) 100 MG capsule; Take 2 capsules (200 mg total) by mouth 3 (three) times daily as needed for Cough.  Dispense: 40 capsule; Refill: 0  -     levocetirizine (XYZAL) 5 MG tablet; Take 1 tablet (5 mg total) by mouth every evening.  Dispense: 30 tablet; Refill: 0    Cough  -     X-Ray Chest PA And Lateral; Future; Expected date: 05/27/2019          Viral Upper Respiratory Illness (Adult)  You have a viral upper respiratory illness (URI), which is another term for the common cold. This illness is contagious during the first few days. It is spread through the air by coughing and sneezing. It may also be spread by direct contact (touching the sick person and then touching your own eyes, nose, or mouth). Frequent handwashing will decrease risk of spread. Most viral illnesses go away within 7 to 10 days with rest and simple home remedies. Sometimes the illness may last for several weeks. Antibiotics will not kill a virus, and they are generally not prescribed for this condition.    Home care  · If symptoms are severe, rest at home for the first 2 to 3 days. When you resume activity, don't let yourself get too tired.  · Avoid being exposed to cigarette smoke (yours or others).  · You may use acetaminophen or ibuprofen to control pain and fever, unless another  medicine was prescribed. (Note: If you have chronic liver or kidney disease, have ever had a stomach ulcer or gastrointestinal bleeding, or are taking blood-thinning medicines, talk with your healthcare provider before using these medicines.) Aspirin should never be given to anyone under 18 years of age who is ill with a viral infection or fever. It may cause severe liver or brain damage.  · Your appetite may be poor, so a light diet is fine. Avoid dehydration by drinking 6 to 8 glasses of fluids per day (water, soft drinks, juices, tea, or soup). Extra fluids will help loosen secretions in the nose and lungs.  · Over-the-counter cold medicines will not shorten the length of time youre sick, but they may be helpful for the following symptoms: cough, sore throat, and nasal and sinus congestion. (Note: Do not use decongestants if you have high blood pressure.)  Follow-up care  Follow up with your healthcare provider, or as advised.  When to seek medical advice  Call your healthcare provider right away if any of these occur:  · Cough with lots of colored sputum (mucus)  · Severe headache; face, neck, or ear pain  · Difficulty swallowing due to throat pain  · Fever of 100.4°F (38°C)  Call 911, or get immediate medical care  Call emergency services right away if any of these occur:  · Chest pain, shortness of breath, wheezing, or difficulty breathing  · Coughing up blood  · Inability to swallow due to throat pain  Date Last Reviewed: 9/13/2015  © 6385-0577 [employer]. [address] [place] [address], [place] [address]. All rights reserved. This information is not intended as a substitute for professional medical care. Always follow your healthcare professional's instructions.      Patient Instructions   -Below are suggestions for symptomatic relief:              -Tylenol every 4 hours OR ibuprofen every 6 hours as needed for pain/fever.              -Salt water gargles to soothe throat pain.               -Chloroseptic spray also helps to numb throat pain.              -Nasal saline spray reduces inflammation and dryness.              -Warm face compresses to help with facial sinus pain/pressure.              -Vicks vapor rub at night.              -Flonase OTC or Nasacort OTC for nasal congestion.              -Simple foods like chicken noodle soup.              -Delsym helps with coughing at night              -Zyrtec/Claritin during the day & Benadryl at night may help with allergies.                If you DO NOT have Hypertension or any history of palpitations, it is ok to take over the counter Sudafed or Mucinex D or Allegra-D or Claritin-D or Zyrtec-D.  If you do take one of the above, it is ok to combine that with plain over the counter Mucinex or Allegra or Claritin or Zyrtec. If, for example, you are taking Zyrtec -D, you can combine that with Mucinex, but not Mucinex-D.  If you are taking Mucinex-D, you can combine that with plain Allegra or Claritin or Zyrtec.   If you DO have Hypertension or palpitations, it is safe to take Coricidin HBP for relief of sinus symptoms.    Please follow up with your Primary care provider within 2-5 days if your signs and symptoms have not resolved or worsen.     If your condition worsens or fails to improve we recommend that you receive another evaluation at the emergency room immediately or contact your primary medical clinic to discuss your concerns.   You must understand that you have received an Urgent Care treatment only and that you may be released before all of your medical problems are known or treated. You, the patient, will arrange for follow up care as instructed.     RED FLAGS/WARNING SYMPTOMS DISCUSSED WITH PATIENT THAT WOULD WARRANT EMERGENT MEDICAL ATTENTION. PATIENT VERBALIZED UNDERSTANDING.

## 2019-05-27 NOTE — PROGRESS NOTES
Subjective:       Patient ID: Yossi James is a 23 y.o. male.    Vitals:  height is 6' (1.829 m) and weight is 83.9 kg (185 lb). His oral temperature is 98.4 °F (36.9 °C). His blood pressure is 145/76 (abnormal) and his pulse is 82. His respiration is 16 and oxygen saturation is 100%.     Chief Complaint: Cough    Cough   This is a new problem. The current episode started in the past 7 days. The problem has been unchanged. The problem occurs every few minutes. The cough is non-productive. Pertinent negatives include no chills, ear pain, eye redness, fever, hemoptysis, myalgias, rash, sore throat, shortness of breath or wheezing. Nothing (mostly at night ) aggravates the symptoms. He has tried OTC cough suppressant for the symptoms. The treatment provided no relief.       Constitution: Negative for chills, sweating, fatigue and fever.   HENT: Negative for ear pain, congestion, sinus pain, sinus pressure, sore throat and voice change.    Neck: Negative for painful lymph nodes.   Eyes: Negative for eye redness.   Respiratory: Positive for cough. Negative for chest tightness, sputum production, bloody sputum, COPD, shortness of breath, stridor, wheezing and asthma.         1 week    Gastrointestinal: Negative for nausea and vomiting.   Musculoskeletal: Negative for muscle ache.   Skin: Negative for rash.   Allergic/Immunologic: Negative for seasonal allergies and asthma.   Hematologic/Lymphatic: Negative for swollen lymph nodes.       Objective:      Physical Exam    Assessment:       No diagnosis found.    Plan:         There are no diagnoses linked to this encounter.

## 2019-05-27 NOTE — PATIENT INSTRUCTIONS
Viral Upper Respiratory Illness (Adult)  You have a viral upper respiratory illness (URI), which is another term for the common cold. This illness is contagious during the first few days. It is spread through the air by coughing and sneezing. It may also be spread by direct contact (touching the sick person and then touching your own eyes, nose, or mouth). Frequent handwashing will decrease risk of spread. Most viral illnesses go away within 7 to 10 days with rest and simple home remedies. Sometimes the illness may last for several weeks. Antibiotics will not kill a virus, and they are generally not prescribed for this condition.    Home care  · If symptoms are severe, rest at home for the first 2 to 3 days. When you resume activity, don't let yourself get too tired.  · Avoid being exposed to cigarette smoke (yours or others).  · You may use acetaminophen or ibuprofen to control pain and fever, unless another medicine was prescribed. (Note: If you have chronic liver or kidney disease, have ever had a stomach ulcer or gastrointestinal bleeding, or are taking blood-thinning medicines, talk with your healthcare provider before using these medicines.) Aspirin should never be given to anyone under 18 years of age who is ill with a viral infection or fever. It may cause severe liver or brain damage.  · Your appetite may be poor, so a light diet is fine. Avoid dehydration by drinking 6 to 8 glasses of fluids per day (water, soft drinks, juices, tea, or soup). Extra fluids will help loosen secretions in the nose and lungs.  · Over-the-counter cold medicines will not shorten the length of time youre sick, but they may be helpful for the following symptoms: cough, sore throat, and nasal and sinus congestion. (Note: Do not use decongestants if you have high blood pressure.)  Follow-up care  Follow up with your healthcare provider, or as advised.  When to seek medical advice  Call your healthcare provider right away if any  of these occur:  · Cough with lots of colored sputum (mucus)  · Severe headache; face, neck, or ear pain  · Difficulty swallowing due to throat pain  · Fever of 100.4°F (38°C)  Call 911, or get immediate medical care  Call emergency services right away if any of these occur:  · Chest pain, shortness of breath, wheezing, or difficulty breathing  · Coughing up blood  · Inability to swallow due to throat pain  Date Last Reviewed: 9/13/2015  © 8358-8789 [employer]. [address] [place] [address]. All rights reserved. This information is not intended as a substitute for professional medical care. Always follow your healthcare professional's instructions.      Patient Instructions   -Below are suggestions for symptomatic relief:              -Tylenol every 4 hours OR ibuprofen every 6 hours as needed for pain/fever.              -Salt water gargles to soothe throat pain.              -Chloroseptic spray also helps to numb throat pain.              -Nasal saline spray reduces inflammation and dryness.              -Warm face compresses to help with facial sinus pain/pressure.              -Vicks vapor rub at night.              -Flonase OTC or Nasacort OTC for nasal congestion.              -Simple foods like chicken noodle soup.              -Delsym helps with coughing at night              -Zyrtec/Claritin during the day & Benadryl at night may help with allergies.                If you DO NOT have Hypertension or any history of palpitations, it is ok to take over the counter Sudafed or Mucinex D or Allegra-D or Claritin-D or Zyrtec-D.  If you do take one of the above, it is ok to combine that with plain over the counter Mucinex or Allegra or Claritin or Zyrtec. If, for example, you are taking Zyrtec -D, you can combine that with Mucinex, but not Mucinex-D.  If you are taking Mucinex-D, you can combine that with plain Allegra or Claritin or Zyrtec.   If you DO have Hypertension or palpitations,  it is safe to take Coricidin HBP for relief of sinus symptoms.    Please follow up with your Primary care provider within 2-5 days if your signs and symptoms have not resolved or worsen.     If your condition worsens or fails to improve we recommend that you receive another evaluation at the emergency room immediately or contact your primary medical clinic to discuss your concerns.   You must understand that you have received an Urgent Care treatment only and that you may be released before all of your medical problems are known or treated. You, the patient, will arrange for follow up care as instructed.     RED FLAGS/WARNING SYMPTOMS DISCUSSED WITH PATIENT THAT WOULD WARRANT EMERGENT MEDICAL ATTENTION. PATIENT VERBALIZED UNDERSTANDING.

## 2019-07-23 ENCOUNTER — LAB VISIT (OUTPATIENT)
Dept: LAB | Facility: HOSPITAL | Age: 24
End: 2019-07-23
Attending: INTERNAL MEDICINE
Payer: COMMERCIAL

## 2019-07-23 ENCOUNTER — OFFICE VISIT (OUTPATIENT)
Dept: INTERNAL MEDICINE | Facility: CLINIC | Age: 24
End: 2019-07-23
Payer: COMMERCIAL

## 2019-07-23 VITALS
BODY MASS INDEX: 24.69 KG/M2 | WEIGHT: 182.31 LBS | HEIGHT: 72 IN | HEART RATE: 88 BPM | DIASTOLIC BLOOD PRESSURE: 66 MMHG | OXYGEN SATURATION: 98 % | SYSTOLIC BLOOD PRESSURE: 130 MMHG

## 2019-07-23 DIAGNOSIS — Z13.6 ENCOUNTER FOR LIPID SCREENING FOR CARDIOVASCULAR DISEASE: ICD-10-CM

## 2019-07-23 DIAGNOSIS — Z11.4 ENCOUNTER FOR SCREENING FOR HIV: ICD-10-CM

## 2019-07-23 DIAGNOSIS — Z23 NEED FOR DIPHTHERIA-TETANUS-PERTUSSIS (TDAP) VACCINE: ICD-10-CM

## 2019-07-23 DIAGNOSIS — S83.8X9S: ICD-10-CM

## 2019-07-23 DIAGNOSIS — Z00.00 ANNUAL PHYSICAL EXAM: ICD-10-CM

## 2019-07-23 DIAGNOSIS — Z00.00 ANNUAL PHYSICAL EXAM: Primary | ICD-10-CM

## 2019-07-23 DIAGNOSIS — Z13.220 ENCOUNTER FOR LIPID SCREENING FOR CARDIOVASCULAR DISEASE: ICD-10-CM

## 2019-07-23 DIAGNOSIS — Z11.3 SCREEN FOR STD (SEXUALLY TRANSMITTED DISEASE): ICD-10-CM

## 2019-07-23 DIAGNOSIS — Z23 NEED FOR HPV VACCINATION: ICD-10-CM

## 2019-07-23 LAB
ALBUMIN SERPL BCP-MCNC: 4.3 G/DL (ref 3.5–5.2)
ALP SERPL-CCNC: 62 U/L (ref 55–135)
ALT SERPL W/O P-5'-P-CCNC: 21 U/L (ref 10–44)
ANION GAP SERPL CALC-SCNC: 9 MMOL/L (ref 8–16)
AST SERPL-CCNC: 29 U/L (ref 10–40)
BILIRUB SERPL-MCNC: 0.6 MG/DL (ref 0.1–1)
BUN SERPL-MCNC: 22 MG/DL (ref 6–20)
CALCIUM SERPL-MCNC: 10.1 MG/DL (ref 8.7–10.5)
CHLORIDE SERPL-SCNC: 104 MMOL/L (ref 95–110)
CHOLEST SERPL-MCNC: 184 MG/DL (ref 120–199)
CHOLEST/HDLC SERPL: 2.6 {RATIO} (ref 2–5)
CO2 SERPL-SCNC: 26 MMOL/L (ref 23–29)
CREAT SERPL-MCNC: 1 MG/DL (ref 0.5–1.4)
EST. GFR  (AFRICAN AMERICAN): >60 ML/MIN/1.73 M^2
EST. GFR  (NON AFRICAN AMERICAN): >60 ML/MIN/1.73 M^2
GLUCOSE SERPL-MCNC: 77 MG/DL (ref 70–110)
HDLC SERPL-MCNC: 72 MG/DL (ref 40–75)
HDLC SERPL: 39.1 % (ref 20–50)
LDLC SERPL CALC-MCNC: 102.6 MG/DL (ref 63–159)
NONHDLC SERPL-MCNC: 112 MG/DL
POTASSIUM SERPL-SCNC: 3.7 MMOL/L (ref 3.5–5.1)
PROT SERPL-MCNC: 7.4 G/DL (ref 6–8.4)
SODIUM SERPL-SCNC: 139 MMOL/L (ref 136–145)
TRIGL SERPL-MCNC: 47 MG/DL (ref 30–150)

## 2019-07-23 PROCEDURE — 90715 TDAP VACCINE 7 YRS/> IM: CPT | Mod: S$GLB,,, | Performed by: INTERNAL MEDICINE

## 2019-07-23 PROCEDURE — 90471 TDAP VACCINE GREATER THAN OR EQUAL TO 7YO IM: ICD-10-PCS | Mod: S$GLB,,, | Performed by: INTERNAL MEDICINE

## 2019-07-23 PROCEDURE — 90471 IMMUNIZATION ADMIN: CPT | Mod: S$GLB,,, | Performed by: INTERNAL MEDICINE

## 2019-07-23 PROCEDURE — 80053 COMPREHEN METABOLIC PANEL: CPT

## 2019-07-23 PROCEDURE — 90715 TDAP VACCINE GREATER THAN OR EQUAL TO 7YO IM: ICD-10-PCS | Mod: S$GLB,,, | Performed by: INTERNAL MEDICINE

## 2019-07-23 PROCEDURE — 36415 COLL VENOUS BLD VENIPUNCTURE: CPT | Mod: PO

## 2019-07-23 PROCEDURE — 99999 PR PBB SHADOW E&M-EST. PATIENT-LVL III: CPT | Mod: PBBFAC,,, | Performed by: INTERNAL MEDICINE

## 2019-07-23 PROCEDURE — 80061 LIPID PANEL: CPT

## 2019-07-23 PROCEDURE — 86696 HERPES SIMPLEX TYPE 2 TEST: CPT

## 2019-07-23 PROCEDURE — 99385 PREV VISIT NEW AGE 18-39: CPT | Mod: 25,S$GLB,, | Performed by: INTERNAL MEDICINE

## 2019-07-23 PROCEDURE — 86703 HIV-1/HIV-2 1 RESULT ANTBDY: CPT

## 2019-07-23 PROCEDURE — 99999 PR PBB SHADOW E&M-EST. PATIENT-LVL III: ICD-10-PCS | Mod: PBBFAC,,, | Performed by: INTERNAL MEDICINE

## 2019-07-23 PROCEDURE — 99385 PR PREVENTIVE VISIT,NEW,18-39: ICD-10-PCS | Mod: 25,S$GLB,, | Performed by: INTERNAL MEDICINE

## 2019-07-23 NOTE — PATIENT INSTRUCTIONS
"Sexual Health Services   Flexible screening for sexual health concerns such as HIV, gonorrhea, chlamydia, syphilis and others are available at this clinic. However, you first need to talk to a member of my clinic staff so we can prepare the necessary screening materials and arrange an appropriate date and time for your visit.     How to Set up Screening  Simply call the office ahead of time and inform my medical assistant that you would like STD testing. If you have received specific information about a specific possible STD exposure (e.g syphilis) please also mention this to my assistant.      When to Consider Extra Screening  The following circumstances may warrant additional testing to help promote sexual health.    · After a casual sexual encounter especially if a condom was not used for oral/anal/vaginal sex.      · Even if you are starting a monogamous ("closed") relationship, it's better to be sure that neither of you have a silent infection before things get sexual. Even if your partner has been tested, not all medical offices offer the right STD testing so to remove any doubts, consider scheduling a visit and ask us for assistance to schedule your new partner for a comprehensive sexual health visit.     · If you have any of the following symptoms including burning with urination, the strong desire to urinate more frequently than usual, rectal pain or discomfort, an unusual skin rash especially if on the palms or soles, ulcer or sore in the throat or around the genitals, an unusual sore throat, fever, or swelling of the glands in the neck or groin.      "

## 2019-07-23 NOTE — PROGRESS NOTES
Portions of this note are generated with voice recognition software. Typographical errors may exist.       Patient Name:YURIDIA PACHECO  Patient MRN:   [id number]    History of Present Illness   ================================================================  YURIDIA PACHECO is a 23 y.o. male here for primary care visit for  Chief Complaint   Patient presents with    [facility]    Annual Exam       No past medical history on file.    Past Surgical History:   Procedure Laterality Date    REPAIR, MENISCUS, KNEE Left 1/21/2019    Performed by Hubert Mcgrath MD at [facility] OR       Review of patient's allergies indicates:  No Known Allergies    Current Outpatient Medications on File Prior to Visit   Medication Sig Dispense Refill    [DISCONTINUED] benzonatate (TESSALON PERLES) 100 MG capsule Take 2 capsules (200 mg total) by mouth 3 (three) times daily as needed for Cough. 40 capsule 0    [DISCONTINUED] HYDROcodone-acetaminophen (NORCO) 5-325 mg per tablet Take 1 tablet by mouth every 6 (six) hours as needed for Pain (Take 1-2 tablets as needed for pain). 10 tablet 0    [DISCONTINUED] levocetirizine (XYZAL) 5 MG tablet Take 1 tablet (5 mg total) by mouth every evening. 30 tablet 0    [DISCONTINUED] naproxen (NAPROSYN) 500 MG tablet Take 1 tablet (500 mg total) by mouth 2 (two) times daily with meals. 14 tablet 0     Current Facility-Administered Medications on File Prior to Visit   Medication Dose Route Frequency Provider Last Rate Last Dose    [DISCONTINUED] cefazolin (ANCEF) 2 gram in dextrose 5% 50 mL IVPB (premix)  2 g Intravenous 30 Min Pre-Op Juan C Cope, DO           No family history on file.    Social History     Socioeconomic History    Marital status: Single     Spouse name: Not on file    Number of children: Not on file    Years of education: Not on file    Highest education level: Not on file   Occupational History    Not on file   Social Needs    Financial resource strain: Not on file     Food insecurity:     Worry: Not on file     Inability: Not on file    Transportation needs:     Medical: Not on file     Non-medical: Not on file   Tobacco Use    Smoking status: Never Smoker   Substance and Sexual Activity    Alcohol use: No     Frequency: Never    Drug use: Not on file    Sexual activity: Not on file   Lifestyle    Physical activity:     Days per week: Not on file     Minutes per session: Not on file    Stress: Not on file   Relationships    Social connections:     Talks on phone: Not on file     Gets together: Not on file     Attends [employer]ism service: Not on file     Active member of club or organization: Not on file     Attends meetings of clubs or organizations: Not on file     Relationship status: Not on file   Other Topics Concern    Not on file   Social History Narrative    Not on file       Social History     Substance and Sexual Activity   Sexual Activity Not on file         SUBJECTIVE:    Patient has not been in routine primary care since the age of 17.  At that time he had not any significant medical issues.  Did not grow without asthma or any other pediatric conditions.  He did have significant health condition resulting in meniscal injury and had good rehabilitation after surgical intervention.  He is avid in going to the gym and doing resistance training.  He has modified his technique to avoid injury has occurred previously.  Part-time work at [employer].  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 Not sexually active.  No specific cultural or personal reasons.  Sexual orientation unclear.  Patient has good general knowledge of STI prevention        Medications Reviewed and Updated    Past medical, family, and social histories were reviewed and updated.    Review of Systems negative unless otherwise noted in history of present illness-  ROS      General ROS: negative  Psychological ROS: negative  ENT ROS: negative  Endocrine ROS:  Negative  Allergy and Immunology ROS: negative  Pulmonary ROS: Negative  Cardiovascular ROS: negative  Gastrointestinal ROS: negative  Genito-Urinary ROS: negative  Musculoskeletal ROS: negative  Neurological ROS: negative  Dermatological ROS: negative      Allergic:  Review of patient's allergies indicates:  No Known Allergies    OBJECTIVE:  BP: 130/66 Pulse: 88    Wt Readings from Last 3 Encounters:   07/23/19 82.7 kg (182 lb 5.1 oz)   05/27/19 83.9 kg (185 lb)   01/21/19 83.9 kg (185 lb)    Body mass index is 24.73 kg/m².  Previous Blood Pressure Readings :   BP Readings from Last 3 Encounters:   07/23/19 130/66   05/27/19 (!) 145/76   01/21/19 128/64       Physical Exam    GEN: healthy appearing  HEENT: sclera non-icteric, conjunctiva clear  CV: no peripheral edema. Regular rate and rhythm. No murmurs.  PULM: breathing non-labored  ABD: supple. protuberant abdomen.   PSYCH:  Anxious affect  MSK: able to rise from chair without assistance  SKIN: normal skin turgor      Pertinent Labs Reviewed           ASSESSMENT/PLAN:    Annual physical exam  -     Lipid panel; Standing  -     Comprehensive metabolic panel; Standing  -     HERPES SIMPLEX 1&2 IGG; Future; Expected date: 07/23/2019  -     HIV 1/2 Ag/Ab (4th Gen); Future; Expected date: 07/23/2019    Encounter for lipid screening for cardiovascular disease  -     Lipid panel; Standing    Injury of meniscus of knee, unspecified laterality, sequela  -     Comprehensive metabolic panel; Standing    Screen for STD (sexually transmitted disease)  -     HERPES SIMPLEX 1&2 IGG; Future; Expected date: 07/23/2019    Encounter for screening for HIV  -     HIV 1/2 Ag/Ab (4th Gen); Future; Expected date: 07/23/2019    Need for diphtheria-tetanus-pertussis (Tdap) vaccine  -     (In Office Administered) Tdap Vaccine        No future appointments.    Ye Mccabe  7/23/2019  11:55 AM

## 2019-07-24 LAB — HIV 1+2 AB+HIV1 P24 AG SERPL QL IA: NEGATIVE

## 2019-07-26 LAB
HSV1 IGG SERPL QL IA: POSITIVE
HSV2 IGG SERPL QL IA: NEGATIVE

## 2019-08-06 ENCOUNTER — LAB VISIT (OUTPATIENT)
Dept: LAB | Facility: HOSPITAL | Age: 24
End: 2019-08-06
Attending: INTERNAL MEDICINE
Payer: COMMERCIAL

## 2019-08-06 ENCOUNTER — OFFICE VISIT (OUTPATIENT)
Dept: INTERNAL MEDICINE | Facility: CLINIC | Age: 24
End: 2019-08-06
Payer: COMMERCIAL

## 2019-08-06 VITALS
HEART RATE: 88 BPM | BODY MASS INDEX: 24.81 KG/M2 | OXYGEN SATURATION: 99 % | DIASTOLIC BLOOD PRESSURE: 74 MMHG | HEIGHT: 72 IN | SYSTOLIC BLOOD PRESSURE: 130 MMHG | WEIGHT: 183.19 LBS

## 2019-08-06 DIAGNOSIS — Z78.9 MEASLES, MUMPS, RUBELLA (MMR) VACCINATION STATUS UNKNOWN: Primary | ICD-10-CM

## 2019-08-06 DIAGNOSIS — Z78.9 VARICELLA VACCINATION STATUS UNKNOWN: ICD-10-CM

## 2019-08-06 DIAGNOSIS — B00.1 HERPES LABIALIS: ICD-10-CM

## 2019-08-06 DIAGNOSIS — Z78.9 HEPATITIS B VACCINATION STATUS UNKNOWN: ICD-10-CM

## 2019-08-06 DIAGNOSIS — Z11.1 VISIT FOR TB SKIN TEST: ICD-10-CM

## 2019-08-06 DIAGNOSIS — Z78.9 MEASLES, MUMPS, RUBELLA (MMR) VACCINATION STATUS UNKNOWN: ICD-10-CM

## 2019-08-06 PROCEDURE — 86735 MUMPS ANTIBODY: CPT

## 2019-08-06 PROCEDURE — 86580 TB INTRADERMAL TEST: CPT | Mod: S$GLB,,, | Performed by: INTERNAL MEDICINE

## 2019-08-06 PROCEDURE — 99401 PREV MED CNSL INDIV APPRX 15: CPT | Mod: S$GLB,,, | Performed by: INTERNAL MEDICINE

## 2019-08-06 PROCEDURE — 99999 PR PBB SHADOW E&M-EST. PATIENT-LVL III: ICD-10-PCS | Mod: PBBFAC,,, | Performed by: INTERNAL MEDICINE

## 2019-08-06 PROCEDURE — 86787 VARICELLA-ZOSTER ANTIBODY: CPT

## 2019-08-06 PROCEDURE — 86762 RUBELLA ANTIBODY: CPT

## 2019-08-06 PROCEDURE — 86765 RUBEOLA ANTIBODY: CPT

## 2019-08-06 PROCEDURE — 99401 PR PREVENT COUNSEL,INDIV,15 MIN: ICD-10-PCS | Mod: S$GLB,,, | Performed by: INTERNAL MEDICINE

## 2019-08-06 PROCEDURE — 86706 HEP B SURFACE ANTIBODY: CPT

## 2019-08-06 PROCEDURE — 99999 PR PBB SHADOW E&M-EST. PATIENT-LVL III: CPT | Mod: PBBFAC,,, | Performed by: INTERNAL MEDICINE

## 2019-08-06 PROCEDURE — 86580 POCT TB SKIN TEST: ICD-10-PCS | Mod: S$GLB,,, | Performed by: INTERNAL MEDICINE

## 2019-08-06 PROCEDURE — 36415 COLL VENOUS BLD VENIPUNCTURE: CPT | Mod: PO

## 2019-08-06 NOTE — PROGRESS NOTES
Portions of this note are generated with voice recognition software. Typographical errors may exist.     SUBJECTIVE:    This is a/an 23 y.o. male here for primary care visit for  Chief Complaint   Patient presents with    Follow-up     Patient states that when he is about 16 years old he started to have cold sores around the mouth.  Has never had genital symptoms.  Patient states again that he has never been sexually active. No kissing. Heterosexual orientation.  No plans to have intimate partner relations in the future.  Patient states that occasionally he will have labia herpes symptoms.  Not often.  Patient with limited understand regarding modes of transmission.  After education the patient is not interested suppressive or episodic therapy given the lack of frequent symptoms.    Patient is due to start [employer] program soon and needs vaccination and PPD status evaluation.      Medications Reviewed and Updated    Past medical, family, and social histories were reviewed and updated.    Review of Systems negative unless otherwise noted in history of present illness-  ROS    General ROS: negative  Psychological ROS: negative  ENT ROS: negative  Endocrine ROS: Negative  Allergy and Immunology ROS: negative  Cardiovascular ROS: negative  Pulmonary ROS: Negative  Gastrointestinal ROS: negative  Genito-Urinary ROS: negative  Dermatological ROS: negative        Allergic:  Review of patient's allergies indicates:  No Known Allergies    OBJECTIVE:  BP: 130/74 Pulse: 88    Wt Readings from Last 3 Encounters:   08/06/19 83.1 kg (183 lb 3.2 oz)   07/23/19 82.7 kg (182 lb 5.1 oz)   05/27/19 83.9 kg (185 lb)    Body mass index is 24.85 kg/m².  Previous Blood Pressure Readings :   BP Readings from Last 3 Encounters:   08/06/19 130/74   07/23/19 130/66   05/27/19 (!) 145/76       Physical Exam    GEN: No apparent distress  HEENT: sclera non-icteric, conjunctiva clear  CV: no peripheral edema  PULM: breathing non-labored  ABD: non,  protuberant abdomen.  PSYCH: appropriate affect  MSK: able to rise from chair without assistance  SKIN: normal skin turgor    Pertinent Labs Reviewed       ASSESSMENT/PLAN:    Measles, mumps, rubella (MMR) vaccination status unknown  -     Rubella antibody, IgG; Future; Expected date: 08/06/2019  -     Rubeola antibody IgG; Future; Expected date: 08/06/2019  -     Mumps, IgG Screen; Future; Expected date: 08/06/2019    Varicella vaccination status unknown  -     Varicella zoster antibody, IgG; Future; Expected date: 08/06/2019    Hepatitis B vaccination status unknown  -     Hepatitis B Surface Antibody, Qual/Quant; Future; Expected date: 08/06/2019    Visit for TB skin test  -     POCT TB Skin Test Read  -     tuberculin injection 5 Units    Herpes labialis.Condition stable.  Counseling given today on self-care measures. Plan to monitor clinically. Continue current medical plan.             Future Appointments   Date Time Provider Department Center   8/6/2019 11:00 AM [facility]  8/6/2019  10:57 AM

## 2019-08-06 NOTE — PATIENT INSTRUCTIONS
Living with Herpes  To speed healing, take care of open herpes sores. To reduce outbreaks, take care of your health. And to keep from infecting others, learn how to avoid spreading the virus.     To ease symptoms  · Start episodic treatment at the first sign of symptoms, such as itching or tingling.  · Take ibuprofen or acetaminophen to limit any pain.  · Sit in a warm or cool bath or use a moist compress to lessen the itching of sores. For some women, genital outbreaks cause burning during urination. In such cases, urinating in a tub of warm water helps reduce burning.  · Wear white cotton underwear and loose clothing during outbreaks. Dont wear nylon underwear or tight clothes. They can prevent sores from healing.     To speed healing  · Wash sores with mild soap and water. Pat (don't rub) the sores completely dry.  · Always wash your hands after touching a sore.  · Dont bandage sores. Air helps them heal.  · Avoid using any ointment unless it is prescribed. Applying the wrong jelly or cream may hold in moisture and slow healing.  · Dont pick at the sores. This can slow healing, and might cause a sore to become infected.  · If you wear contacts, wash your hands well before putting them in.     To reduce outbreaks  · Eat a balanced diet. Your health care provider may suggest taking supplements. These help ensure that you get all the nutrients you need.  · Get plenty of sleep. This helps your immune system work its best.  · Limit stress and tension. Both can weaken the bodys defenses.  · Limit exposure to sun, wind, and extreme heat or cold. Wear sunscreen and lip balm to help prevent outbreaks.     To protect others  · Tell your current sex partner and any future partners that you have herpes. If you dont know what to say, ask your healthcare provider for help.  · Use a latex condom that covers the affected areas each time you have sex. This reduces the risk of passing herpes to your partner.  · Avoid  kissing when you have an oral sore.  · Do not have intercourse when genital sores are present. Also keep in mind, herpes can be passed during oral sex and with anal contact.  · Dont share towels, toothbrushes, lip balm, or lipstick when you have a sore.  · If you have very frequent outbreaks, taking daily antiviral medicines can help reduce the likelihood of transmission to your partner.  Date Last Reviewed: 1/1/2017  © 7700-5699 [employer]. [address] [place] [address], [place] [address]. All rights reserved. This information is not intended as a substitute for professional medical care. Always follow your healthcare professional's instructions.

## 2019-08-06 NOTE — PROGRESS NOTES
PPD Skin test placed to left forearm. Instructed pt to come back on Thursday 8/8 to have PPD read. Patient voices understanding.

## 2019-08-07 LAB
MUMPS IGG INTERPRETATION: POSITIVE
MUMPS IGG SCREEN: 1.97 ISR (ref 0–0.9)
RUBEOLA IGG ANTIBODY: 2.32 ISR (ref 0–0.9)
RUBEOLA INTERPRETATION: POSITIVE
RUBV IGG SER-ACNC: 43.3 IU/ML
RUBV IGG SER-IMP: REACTIVE
VARICELLA INTERPRETATION: POSITIVE
VARICELLA ZOSTER IGG: 3.41 ISR (ref 0–0.9)

## 2019-08-08 LAB
HBV SURFACE AB SER QL IA: NEGATIVE
HBV SURFACE AB SERPL IA-ACNC: 6 MIU/ML

## 2019-09-04 ENCOUNTER — TELEPHONE (OUTPATIENT)
Dept: INTERNAL MEDICINE | Facility: CLINIC | Age: 24
End: 2019-09-04

## 2019-09-04 ENCOUNTER — PATIENT MESSAGE (OUTPATIENT)
Dept: INTERNAL MEDICINE | Facility: CLINIC | Age: 24
End: 2019-09-04

## 2019-09-04 ENCOUNTER — CLINICAL SUPPORT (OUTPATIENT)
Dept: FAMILY MEDICINE | Facility: CLINIC | Age: 24
End: 2019-09-04
Payer: COMMERCIAL

## 2019-09-04 DIAGNOSIS — Z23 NEED FOR HEPATITIS B VACCINATION: Primary | ICD-10-CM

## 2019-09-04 DIAGNOSIS — Z78.9 HEPATITIS B VIRUS SEROLOGIC STATUS UNKNOWN: Primary | ICD-10-CM

## 2019-09-04 PROCEDURE — 90471 IMMUNIZATION ADMIN: CPT | Mod: S$GLB,,, | Performed by: INTERNAL MEDICINE

## 2019-09-04 PROCEDURE — 90746 HEPB VACCINE 3 DOSE ADULT IM: CPT | Mod: S$GLB,,, | Performed by: INTERNAL MEDICINE

## 2019-09-04 PROCEDURE — 90471 HEPATITIS B VACCINE ADULT IM: ICD-10-PCS | Mod: S$GLB,,, | Performed by: INTERNAL MEDICINE

## 2019-09-04 PROCEDURE — 90746 HEPATITIS B VACCINE ADULT IM: ICD-10-PCS | Mod: S$GLB,,, | Performed by: INTERNAL MEDICINE

## 2019-09-04 NOTE — TELEPHONE ENCOUNTER
----- Message from Marialuisa Kirkland LPN sent at 9/4/2019  9:59 AM CDT -----  Pt will come in today for Hep B vaccine. Can you put the orders in for the lab and I will schedule. Thanks  ----- Message -----  From: Ye Mccabe MD  Sent: 9/4/2019   8:35 AM  To: Eliot ALMEIDA Staff    Patient was supposed to get adult Hep B shot, 1 time as a booster for his pediatric series and have repeat Hep B surface antibody test 1 month later. This is for his [facility] school records

## 2019-10-09 ENCOUNTER — LAB VISIT (OUTPATIENT)
Dept: LAB | Facility: HOSPITAL | Age: 24
End: 2019-10-09
Attending: INTERNAL MEDICINE
Payer: COMMERCIAL

## 2019-10-09 DIAGNOSIS — Z78.9 HEPATITIS B VIRUS SEROLOGIC STATUS UNKNOWN: ICD-10-CM

## 2019-10-09 PROCEDURE — 86706 HEP B SURFACE ANTIBODY: CPT

## 2019-10-09 PROCEDURE — 36415 COLL VENOUS BLD VENIPUNCTURE: CPT | Mod: PO

## 2019-10-11 LAB
HBV SURFACE AB SER QL IA: POSITIVE
HBV SURFACE AB SERPL IA-ACNC: 89 MIU/ML

## 2019-10-30 ENCOUNTER — OFFICE VISIT (OUTPATIENT)
Dept: URGENT CARE | Facility: CLINIC | Age: 24
End: 2019-10-30
Payer: COMMERCIAL

## 2019-10-30 VITALS
HEART RATE: 103 BPM | BODY MASS INDEX: 25.06 KG/M2 | WEIGHT: 185 LBS | TEMPERATURE: 99 F | DIASTOLIC BLOOD PRESSURE: 74 MMHG | HEIGHT: 72 IN | SYSTOLIC BLOOD PRESSURE: 120 MMHG | OXYGEN SATURATION: 96 % | RESPIRATION RATE: 18 BRPM

## 2019-10-30 DIAGNOSIS — R50.9 CHILLS WITH FEVER: ICD-10-CM

## 2019-10-30 DIAGNOSIS — R11.0 NAUSEA: Primary | ICD-10-CM

## 2019-10-30 DIAGNOSIS — K52.9 GASTROENTERITIS: ICD-10-CM

## 2019-10-30 DIAGNOSIS — R19.7 DIARRHEA, UNSPECIFIED TYPE: ICD-10-CM

## 2019-10-30 LAB
CTP QC/QA: YES
FLUAV AG NPH QL: NEGATIVE
FLUBV AG NPH QL: NEGATIVE

## 2019-10-30 PROCEDURE — 3008F BODY MASS INDEX DOCD: CPT | Mod: CPTII,S$GLB,, | Performed by: NURSE PRACTITIONER

## 2019-10-30 PROCEDURE — 99214 OFFICE O/P EST MOD 30 MIN: CPT | Mod: 25,S$GLB,, | Performed by: NURSE PRACTITIONER

## 2019-10-30 PROCEDURE — 87804 INFLUENZA ASSAY W/OPTIC: CPT | Mod: QW,S$GLB,, | Performed by: NURSE PRACTITIONER

## 2019-10-30 PROCEDURE — 3008F PR BODY MASS INDEX (BMI) DOCUMENTED: ICD-10-PCS | Mod: CPTII,S$GLB,, | Performed by: NURSE PRACTITIONER

## 2019-10-30 PROCEDURE — 99214 PR OFFICE/OUTPT VISIT, EST, LEVL IV, 30-39 MIN: ICD-10-PCS | Mod: 25,S$GLB,, | Performed by: NURSE PRACTITIONER

## 2019-10-30 PROCEDURE — 87804 POCT INFLUENZA A/B: ICD-10-PCS | Mod: 59,QW,S$GLB, | Performed by: NURSE PRACTITIONER

## 2019-10-30 RX ORDER — ONDANSETRON 4 MG/1
4 TABLET, ORALLY DISINTEGRATING ORAL EVERY 6 HOURS PRN
Qty: 20 TABLET | Refills: 0 | Status: SHIPPED | OUTPATIENT
Start: 2019-10-30 | End: 2022-09-01

## 2019-10-30 NOTE — PROGRESS NOTES
Subjective:       Patient ID: Yossi James is a 24 y.o. male.    Vitals:  height is 6' (1.829 m) and weight is 83.9 kg (185 lb). His temperature is 98.7 °F (37.1 °C). His blood pressure is 120/74 and his pulse is 103. His respiration is 18 and oxygen saturation is 96%.     Chief Complaint: GI Problem    Ambulatory with c/o body aches that started yesterday with nausea. No vomiting. This morning bm x 2 with one being diarrhea. Non bloody non bilious. + chills with subjective temp.     GI Problem   The primary symptoms include fever, abdominal pain, nausea and diarrhea. Primary symptoms do not include vomiting or dysuria. The illness began yesterday. The onset was sudden. The problem has been gradually improving.   The fever began yesterday. The fever has been gradually improving since its onset. The maximum temperature recorded prior to his arrival was unknown.   The abdominal pain began yesterday. The abdominal pain has been gradually worsening since its onset. The abdominal pain is generalized. The abdominal pain does not radiate. The severity of the abdominal pain is 8/10.   Nausea began yesterday.   The diarrhea began today. The diarrhea is watery. The diarrhea occurs once per day.   The illness is also significant for chills. The illness does not include constipation or back pain.       Constitution: Positive for chills and fever. Negative for appetite change and sweating.   HENT: Negative for voice change.    Neck: negative.   Cardiovascular: Negative.  Negative for chest pain.   Eyes: Negative.    Respiratory: Negative.  Negative for shortness of breath.    Gastrointestinal: Positive for abdominal pain, nausea and diarrhea. Negative for abdominal trauma, abdominal bloating, history of abdominal surgery, vomiting, constipation, dark colored stools and heartburn.   Endocrine: negative.   Genitourinary: Negative.  Negative for dysuria and pelvic pain.   Musculoskeletal: Negative.  Negative for back pain.    Skin: Negative.    Allergic/Immunologic: Negative.    Neurological: Negative.    Hematologic/Lymphatic: Negative.    Psychiatric/Behavioral: Negative.        Objective:      Physical Exam   Constitutional: He is oriented to person, place, and time. He appears well-developed and well-nourished.   HENT:   Head: Normocephalic and atraumatic.   Right Ear: Hearing, tympanic membrane, external ear and ear canal normal.   Left Ear: Hearing, tympanic membrane, external ear and ear canal normal.   Nose: Nose normal.   Mouth/Throat: Oropharynx is clear and moist.   Eyes: Pupils are equal, round, and reactive to light. Conjunctivae and EOM are normal.   Neck: Normal range of motion. Neck supple.   Cardiovascular: Normal rate, regular rhythm, normal heart sounds and intact distal pulses.   Pulmonary/Chest: Effort normal and breath sounds normal.   Abdominal: Soft. Bowel sounds are normal. He exhibits no distension and no mass. There is no tenderness. There is no rebound and no guarding.   Musculoskeletal: Normal range of motion.   Neurological: He is alert and oriented to person, place, and time.   Skin: Skin is warm and dry.         Assessment:       1. Nausea    2. Chills with fever    3. Diarrhea, unspecified type    4. Gastroenteritis        Plan:         Nausea  -     POCT Influenza A/B    Chills with fever  -     POCT Influenza A/B    Diarrhea, unspecified type    Gastroenteritis      Patient Instructions       Low-Fiber Diet     Eggs are high in protein and easy to digest.     Eating a low-fiber diet means eating foods that dont have much fiber. These foods are easy to digest.  Most of the fiber that you eat passes undigested through your bowel. This is what forms stool. Low-fiber foods can help to slow down your bowel movements. When you eat a low-fiber diet, you have fewer stools. This lets your intestine rest.  Your healthcare provider will tell you how long you need to be on this diet. It may only be for a short  time. Low-fiber foods often don't give you all the nutrients you need to keep healthy. Your provider may have you take certain vitamins while you are on this diet.  Reasons to eat a low-fiber diet  The goal of a low-fiber diet is to limit the size and number of your stools. It may be prescribed if you:  · Are having chemotherapy or radiation treatments  · Have had intestinal surgery  · Have a condition that affects your intestine, such as irritable bowel syndrome, Crohns disease, ulcerative colitis, or diverticulitis  General guidelines for a low-fiber diet  In general, a low-fiber diet means having fewer than 13 grams of fiber a day. Your provider may give you a list of things you can and cant eat or drink. Read food labels. Choose foods and drinks that have as close to zero grams of fiber as possible. Here are general guidelines to follow:  Breads, pasta, cereal, rice, and other starches (6 to 11 servings daily)  · What to choose: white bread, biscuits, muffins, and white rolls; plain crackers; waffles; white pasta; white rice; cream of wheat; grits; white pancakes; corn flakes; cooked potatoes without skin.  Fiber content of these foods should be less than 0.5 (1/2) gram per serving.  · What to avoid: whole-wheat or whole-grain breads, crackers, and pasta; breads with seeds or nuts; wheat germ; ami crackers; cornbread; wild or brown rice; whole-grain, bran, and granola cereals; cereals with seeds, nuts, coconut, or dried fruit; potatoes with skin  Milk and dairy (2 servings daily)  · What to choose: milk, buttermilk; yogurt or ice cream without seeds or nuts; custard or pudding; sour cream; cheese and cottage cheese  · What to avoid: ice cream and yogurt with seeds, nuts, or fruit chunks  Fruit (2 to 4 servings daily)  · What to choose: ripe banana; ripe nectarine, peach, apricot, papaya, and plum; soft honeydew melon and cantaloupe; cooked or canned fruit without skin or seeds (not sweetened with sorbitol);  applesauce; strained fruit juice (without pulp)  · What to avoid: raw or dried fruit; all berries; raisins; canned and raw pineapple; prunes and prune juice; fruit juice with pulp  Vegetables (3 to 5 servings daily)  · What to choose: well-cooked or canned vegetables without seeds, such as spinach, eggplant, green and wax beans, carrots, yellow squash, pumpkin; lettuce on a sandwich  · What to avoid: all raw or steamed vegetables; vegetables with seeds, such as unstrained tomato sauce; green peas; lima beans; broccoli; corn; parsnips  Meats and protein (4 to 6 ounces daily)  · What to choose: tender, well-cooked meat, including ground meat, poultry, and fish; eggs; tofu; creamy peanut butter  · What to avoid: tough, chewy meat with gristle; peas, including split, yellow, and black-eyed; beans, including navy, lima, black, garbanzo, soy, pacheco, and lentil; peanuts and crunchy peanut butter   Fats, oils, sauces, condiments (fewer than 8 teaspoons daily)  · What to choose: butter, magarine, oils, whipped cream, sour cream, mayonnaise, smooth dressings and sauces; plain gravy; smooth condiments  · What to avoid: dressing with seeds or fruit chunks; pickles and relishes  Other foods and drinks  · What to choose: water; plain gelatin; plain puddings; pretzels; plain cookies and cakes; honey, syrup; decaffeinated drinks, including tea and coffee    · What to avoid: popcorn; potato chips; spicy foods; fried, greasy foods; alcohol (ask your provider); marmalade, jam, and preserves; desserts that have seeds, nuts, coconut, dried fruit, whole grains or bran; candy that has seeds or nuts; drinks sweetened with sorbitol or other sugar substitutes; caffeinated drinks, including tea, coffee, soda, and energy drinks  Date Last Reviewed: 6/18/2015 © 2000-2017 [employer]. [address] [place] [address], [place] [address]. All rights reserved. This information is not intended as a substitute for professional medical care.  Always follow your healthcare professional's instructions.        Treating Diarrhea    Diarrhea happens when you have loose, watery, or frequent bowel movements. It is a common problem with many causes. Most cases of diarrhea clear up on their own. But certain cases may need treatment. Be sure to see your healthcare provider if your symptoms do not improve within a few days.  Getting relief  Treatment of diarrhea depends on its cause. Diarrhea caused by bacterial or parasite infection is often treated with antibiotics. Diarrhea caused by other factors, such as a stomach virus, often improves with simple home treatment. The tips below may also help relieve your symptoms.  · Drink plenty of fluids. This helps prevent too much fluid loss (dehydration). Water, clear soups, and electrolyte solutions are good choices. Avoid alcohol, coffee, tea, and milk. These can irritate your intestines and make symptoms worse.  · Suck on ice chips if drinking makes you queasy.  · Return to your normal diet slowly. You may want to eat bland foods at first, such as rice and toast. Also, you may need to avoid certain foods for a while, such as dairy products. These can make symptoms worse. Ask your healthcare provider if there are any other foods you should avoid.  · If you were prescribed antibiotics, take them as directed.  · Do not take anti-diarrhea medicines without asking your healthcare provider first.  Call your healthcare provider   Call your healthcare provider if you have any of the following:   · A fever of 100.4°F (38.0°C) or higher, or as directed by your healthcare provider  · Severe pain  · Worsening diarrhea or diarrhea for more than 2 days  · Bloody vomit or stool  · Signs of dehydration (dizziness, dry mouth and tongue, rapid pulse, dark urine)  Date Last Reviewed: 7/1/2016 © 2000-2017 [employer]. [address] [place] [address], [place] [address]. All rights reserved. This information is not intended as a  substitute for professional medical care. Always follow your healthcare professional's instructions.        Treating Diarrhea    Diarrhea happens when you have loose, watery, or frequent bowel movements. It is a common problem with many causes. Most cases of diarrhea clear up on their own. But certain cases may need treatment. Be sure to see your healthcare provider if your symptoms do not improve within a few days.  Getting relief  Treatment of diarrhea depends on its cause. Diarrhea caused by bacterial or parasite infection is often treated with antibiotics. Diarrhea caused by other factors, such as a stomach virus, often improves with simple home treatment. The tips below may also help relieve your symptoms.  · Drink plenty of fluids. This helps prevent too much fluid loss (dehydration). Water, clear soups, and electrolyte solutions are good choices. Avoid alcohol, coffee, tea, and milk. These can irritate your intestines and make symptoms worse.  · Suck on ice chips if drinking makes you queasy.  · Return to your normal diet slowly. You may want to eat bland foods at first, such as rice and toast. Also, you may need to avoid certain foods for a while, such as dairy products. These can make symptoms worse. Ask your healthcare provider if there are any other foods you should avoid.  · If you were prescribed antibiotics, take them as directed.  · Do not take anti-diarrhea medicines without asking your healthcare provider first.  Call your healthcare provider   Call your healthcare provider if you have any of the following:   · A fever of 100.4°F (38.0°C) or higher, or as directed by your healthcare provider  · Severe pain  · Worsening diarrhea or diarrhea for more than 2 days  · Bloody vomit or stool  · Signs of dehydration (dizziness, dry mouth and tongue, rapid pulse, dark urine)  Date Last Reviewed: 7/1/2016  © 0839-4322 [employer]. [address] [place] [address], [place] [address]. All rights reserved.  This information is not intended as a substitute for professional medical care. Always follow your healthcare professional's instructions.

## 2019-10-30 NOTE — LETTER
October 30, 2019      [facility] [address] [place] [address]  Phone: [phone]  Fax: [phone]       Patient: Yossi James   YOB: 1995  Date of Visit: 10/30/2019    To Whom It May Concern:    Jan James  was at [facility] on 10/30/2019. He may return to work/school on 11/01/2019 with no restrictions. If you have any questions or concerns, or if I can be of further assistance, please do not hesitate to contact me.    Sincerely,    Ross Galindo MA

## 2019-10-30 NOTE — PATIENT INSTRUCTIONS
Low-Fiber Diet     Eggs are high in protein and easy to digest.     Eating a low-fiber diet means eating foods that dont have much fiber. These foods are easy to digest.  Most of the fiber that you eat passes undigested through your bowel. This is what forms stool. Low-fiber foods can help to slow down your bowel movements. When you eat a low-fiber diet, you have fewer stools. This lets your intestine rest.  Your healthcare provider will tell you how long you need to be on this diet. It may only be for a short time. Low-fiber foods often don't give you all the nutrients you need to keep healthy. Your provider may have you take certain vitamins while you are on this diet.  Reasons to eat a low-fiber diet  The goal of a low-fiber diet is to limit the size and number of your stools. It may be prescribed if you:  · Are having chemotherapy or radiation treatments  · Have had intestinal surgery  · Have a condition that affects your intestine, such as irritable bowel syndrome, Crohns disease, ulcerative colitis, or diverticulitis  General guidelines for a low-fiber diet  In general, a low-fiber diet means having fewer than 13 grams of fiber a day. Your provider may give you a list of things you can and cant eat or drink. Read food labels. Choose foods and drinks that have as close to zero grams of fiber as possible. Here are general guidelines to follow:  Breads, pasta, cereal, rice, and other starches (6 to 11 servings daily)  · What to choose: white bread, biscuits, muffins, and white rolls; plain crackers; waffles; white pasta; white rice; cream of wheat; grits; white pancakes; corn flakes; cooked potatoes without skin.  Fiber content of these foods should be less than 0.5 (1/2) gram per serving.  · What to avoid: whole-wheat or whole-grain breads, crackers, and pasta; breads with seeds or nuts; wheat germ; ami crackers; cornbread; wild or brown rice; whole-grain, bran, and granola cereals; cereals with seeds,  nuts, coconut, or dried fruit; potatoes with skin  Milk and dairy (2 servings daily)  · What to choose: milk, buttermilk; yogurt or ice cream without seeds or nuts; custard or pudding; sour cream; cheese and cottage cheese  · What to avoid: ice cream and yogurt with seeds, nuts, or fruit chunks  Fruit (2 to 4 servings daily)  · What to choose: ripe banana; ripe nectarine, peach, apricot, papaya, and plum; soft honeydew melon and cantaloupe; cooked or canned fruit without skin or seeds (not sweetened with sorbitol); applesauce; strained fruit juice (without pulp)  · What to avoid: raw or dried fruit; all berries; raisins; canned and raw pineapple; prunes and prune juice; fruit juice with pulp  Vegetables (3 to 5 servings daily)  · What to choose: well-cooked or canned vegetables without seeds, such as spinach, eggplant, green and wax beans, carrots, yellow squash, pumpkin; lettuce on a sandwich  · What to avoid: all raw or steamed vegetables; vegetables with seeds, such as unstrained tomato sauce; green peas; lima beans; broccoli; corn; parsnips  Meats and protein (4 to 6 ounces daily)  · What to choose: tender, well-cooked meat, including ground meat, poultry, and fish; eggs; tofu; creamy peanut butter  · What to avoid: tough, chewy meat with gristle; peas, including split, yellow, and black-eyed; beans, including navy, lima, black, garbanzo, soy, pacheco, and lentil; peanuts and crunchy peanut butter   Fats, oils, sauces, condiments (fewer than 8 teaspoons daily)  · What to choose: butter, magarine, oils, whipped cream, sour cream, mayonnaise, smooth dressings and sauces; plain gravy; smooth condiments  · What to avoid: dressing with seeds or fruit chunks; pickles and relishes  Other foods and drinks  · What to choose: water; plain gelatin; plain puddings; pretzels; plain cookies and cakes; honey, syrup; decaffeinated drinks, including tea and coffee    · What to avoid: popcorn; potato chips; spicy foods; fried,  greasy foods; alcohol (ask your provider); marmalade, jam, and preserves; desserts that have seeds, nuts, coconut, dried fruit, whole grains or bran; candy that has seeds or nuts; drinks sweetened with sorbitol or other sugar substitutes; caffeinated drinks, including tea, coffee, soda, and energy drinks  Date Last Reviewed: 6/18/2015  © 5529-2264 [employer]. [address] [place] [address], [place] [address]. All rights reserved. This information is not intended as a substitute for professional medical care. Always follow your healthcare professional's instructions.        Treating Diarrhea    Diarrhea happens when you have loose, watery, or frequent bowel movements. It is a common problem with many causes. Most cases of diarrhea clear up on their own. But certain cases may need treatment. Be sure to see your healthcare provider if your symptoms do not improve within a few days.  Getting relief  Treatment of diarrhea depends on its cause. Diarrhea caused by bacterial or parasite infection is often treated with antibiotics. Diarrhea caused by other factors, such as a stomach virus, often improves with simple home treatment. The tips below may also help relieve your symptoms.  · Drink plenty of fluids. This helps prevent too much fluid loss (dehydration). Water, clear soups, and electrolyte solutions are good choices. Avoid alcohol, coffee, tea, and milk. These can irritate your intestines and make symptoms worse.  · Suck on ice chips if drinking makes you queasy.  · Return to your normal diet slowly. You may want to eat bland foods at first, such as rice and toast. Also, you may need to avoid certain foods for a while, such as dairy products. These can make symptoms worse. Ask your healthcare provider if there are any other foods you should avoid.  · If you were prescribed antibiotics, take them as directed.  · Do not take anti-diarrhea medicines without asking your healthcare provider first.  Call your  healthcare provider   Call your healthcare provider if you have any of the following:   · A fever of 100.4°F (38.0°C) or higher, or as directed by your healthcare provider  · Severe pain  · Worsening diarrhea or diarrhea for more than 2 days  · Bloody vomit or stool  · Signs of dehydration (dizziness, dry mouth and tongue, rapid pulse, dark urine)  Date Last Reviewed: 7/1/2016  © 5272-2715 [employer]. [address] [place] [address], [place] [address]. All rights reserved. This information is not intended as a substitute for professional medical care. Always follow your healthcare professional's instructions.        Treating Diarrhea    Diarrhea happens when you have loose, watery, or frequent bowel movements. It is a common problem with many causes. Most cases of diarrhea clear up on their own. But certain cases may need treatment. Be sure to see your healthcare provider if your symptoms do not improve within a few days.  Getting relief  Treatment of diarrhea depends on its cause. Diarrhea caused by bacterial or parasite infection is often treated with antibiotics. Diarrhea caused by other factors, such as a stomach virus, often improves with simple home treatment. The tips below may also help relieve your symptoms.  · Drink plenty of fluids. This helps prevent too much fluid loss (dehydration). Water, clear soups, and electrolyte solutions are good choices. Avoid alcohol, coffee, tea, and milk. These can irritate your intestines and make symptoms worse.  · Suck on ice chips if drinking makes you queasy.  · Return to your normal diet slowly. You may want to eat bland foods at first, such as rice and toast. Also, you may need to avoid certain foods for a while, such as dairy products. These can make symptoms worse. Ask your healthcare provider if there are any other foods you should avoid.  · If you were prescribed antibiotics, take them as directed.  · Do not take anti-diarrhea medicines without asking  your healthcare provider first.  Call your healthcare provider   Call your healthcare provider if you have any of the following:   · A fever of 100.4°F (38.0°C) or higher, or as directed by your healthcare provider  · Severe pain  · Worsening diarrhea or diarrhea for more than 2 days  · Bloody vomit or stool  · Signs of dehydration (dizziness, dry mouth and tongue, rapid pulse, dark urine)  Date Last Reviewed: 7/1/2016  © 2380-0922 [employer]. [address] [place] [address]. All rights reserved. This information is not intended as a substitute for professional medical care. Always follow your healthcare professional's instructions.

## 2019-12-10 ENCOUNTER — TELEPHONE (OUTPATIENT)
Dept: FAMILY MEDICINE | Facility: CLINIC | Age: 24
End: 2019-12-10

## 2019-12-31 ENCOUNTER — DOCUMENTATION ONLY (OUTPATIENT)
Dept: INTERNAL MEDICINE | Facility: CLINIC | Age: 24
End: 2019-12-31

## 2020-05-21 ENCOUNTER — TELEPHONE (OUTPATIENT)
Dept: INTERNAL MEDICINE | Facility: CLINIC | Age: 25
End: 2020-05-21

## 2020-05-21 ENCOUNTER — CLINICAL SUPPORT (OUTPATIENT)
Dept: URGENT CARE | Facility: CLINIC | Age: 25
End: 2020-05-21
Payer: COMMERCIAL

## 2020-05-21 ENCOUNTER — OFFICE VISIT (OUTPATIENT)
Dept: URGENT CARE | Facility: CLINIC | Age: 25
End: 2020-05-21
Payer: COMMERCIAL

## 2020-05-21 VITALS
TEMPERATURE: 98 F | WEIGHT: 190 LBS | HEART RATE: 106 BPM | HEIGHT: 72 IN | OXYGEN SATURATION: 100 % | BODY MASS INDEX: 25.73 KG/M2 | TEMPERATURE: 98 F

## 2020-05-21 DIAGNOSIS — Z20.822 SUSPECTED COVID-19 VIRUS INFECTION: Primary | ICD-10-CM

## 2020-05-21 PROCEDURE — 86769 SARS-COV-2 COVID-19 ANTIBODY: CPT

## 2020-05-21 PROCEDURE — U0003 INFECTIOUS AGENT DETECTION BY NUCLEIC ACID (DNA OR RNA); SEVERE ACUTE RESPIRATORY SYNDROME CORONAVIRUS 2 (SARS-COV-2) (CORONAVIRUS DISEASE [COVID-19]), AMPLIFIED PROBE TECHNIQUE, MAKING USE OF HIGH THROUGHPUT TECHNOLOGIES AS DESCRIBED BY CMS-2020-01-R: HCPCS

## 2020-05-21 NOTE — PROGRESS NOTES
Subjective:       Patient ID: Yossi James is a 24 y.o. male.    Vitals:  height is 6' (1.829 m) and weight is 86.2 kg (190 lb). His temperature is 97.7 °F (36.5 °C). His pulse is 106. His oxygen saturation is 100%.     Chief Complaint: Labs Only (Covid-19 Test & Anti-body Test)    This is a 24 y.o. male who presents today with a chief complaint of needing a covid-19 screening and antibody performed. Patient did mention that he did have body aches, chills & headache last night. Last night was his only night of symptoms and patient claims that his symptoms have resolved.        Constitution: Positive for chills. Negative for fever.   Respiratory: Negative for cough.    Musculoskeletal: Positive for muscle ache.   Neurological: Positive for headaches.       Objective:      Physical Exam      Assessment:       No diagnosis found.    Plan:         There are no diagnoses linked to this encounter.

## 2020-05-21 NOTE — TELEPHONE ENCOUNTER
Spoke with patient who informed me he has been having  chills ,body aches no cough or shore throat  patient works for [employer] and did mention a few people have been positive for covid -19. Patient did agree to get test for covid -19 if needed .

## 2020-05-21 NOTE — TELEPHONE ENCOUNTER
Onset of Illness: 5/20     Vital Signs      Temperature   5/20 99.1     Symptoms   Chills ,body aches no cough or shore throat  patient works for [employer] and did mention a few people have been positive for covid -19. Patient did agree to get test for covid -19 if needed .    Treatments   No OTCs     Home Contacts  Mom dad, Brother sister  4 bedroom  2 bathrooms  None feeling sick except him     Occupational History   2 weeks since others have tested positive. 6 employees tested positive.   Last day 5/20      ASSESSMENT     Determined to remain at home: .Yes         PLAN   Future appointments postponed as indicated  Patient advised to have thermometer and home BP machine for COVID19 preparedness     Check temperature twice daily.  Write it down on a piece of paper.  Write down administration of fever reducing medicine.  Check other vital signs as indicated.  Write it down on paper and be prepared to discuss with our office   Order for quarantine  Patient to notify employer if relevant to patient social circumstances  Patient to communicate result to close contacts 2-3 days prior to onset of symptoms  Schedule future visit  COVID Nurse Order, symptom follow up

## 2020-05-21 NOTE — TELEPHONE ENCOUNTER
----- Message from Latoya Rubio MA sent at 5/21/2020 11:39 AM CDT -----  Patient who informed me he has been having  chills ,body aches no cough or shore throat  patient works for [employer] and did mention a few people have been positive for covid -19. Patient did agree to get test for covid -19 if needed .

## 2020-05-21 NOTE — PROGRESS NOTES
Patient is here per orders for a covid-19 screening test and anti-body test.   Patient presents today for her annual exam.  She has not had a pap. Her last menses started 1/25/19 and she reports her cycles to be sporadic. Currently she is using pill for birth control and is satisfied. Latex:  Patient denies allergy to latex. Medications reviewed with patient. Tobacco use verified. Allergies verified. PMD - Elo Oliva MD    Occupation:  student        Patient would like communication of their results via:      Cell Phone:   Telephone Information:   Mobile [phone] to leave a message containing results? Yes     Patient's current [facility] status: Active.      Chaperone needed:  Yes present with mother

## 2020-05-22 ENCOUNTER — PATIENT MESSAGE (OUTPATIENT)
Dept: INTERNAL MEDICINE | Facility: CLINIC | Age: 25
End: 2020-05-22

## 2020-05-22 LAB
SARS-COV-2 IGG SERPLBLD QL IA.RAPID: NEGATIVE
SARS-COV-2 RNA RESP QL NAA+PROBE: NOT DETECTED

## 2021-04-16 ENCOUNTER — PATIENT MESSAGE (OUTPATIENT)
Dept: RESEARCH | Facility: HOSPITAL | Age: 26
End: 2021-04-16

## 2021-05-12 ENCOUNTER — OFFICE VISIT (OUTPATIENT)
Dept: URGENT CARE | Facility: CLINIC | Age: 26
End: 2021-05-12
Payer: COMMERCIAL

## 2021-05-12 VITALS
WEIGHT: 190 LBS | OXYGEN SATURATION: 98 % | HEART RATE: 86 BPM | HEIGHT: 72 IN | TEMPERATURE: 98 F | RESPIRATION RATE: 16 BRPM | SYSTOLIC BLOOD PRESSURE: 124 MMHG | BODY MASS INDEX: 25.73 KG/M2 | DIASTOLIC BLOOD PRESSURE: 82 MMHG

## 2021-05-12 DIAGNOSIS — J06.9 VIRAL URI WITH COUGH: Primary | ICD-10-CM

## 2021-05-12 LAB
CTP QC/QA: YES
SARS-COV-2 RDRP RESP QL NAA+PROBE: NEGATIVE

## 2021-05-12 PROCEDURE — 3008F BODY MASS INDEX DOCD: CPT | Mod: CPTII,S$GLB,, | Performed by: NURSE PRACTITIONER

## 2021-05-12 PROCEDURE — 1125F PR PAIN SEVERITY QUANTIFIED, PAIN PRESENT: ICD-10-PCS | Mod: S$GLB,,, | Performed by: NURSE PRACTITIONER

## 2021-05-12 PROCEDURE — U0002: ICD-10-PCS | Mod: QW,S$GLB,, | Performed by: NURSE PRACTITIONER

## 2021-05-12 PROCEDURE — 3008F PR BODY MASS INDEX (BMI) DOCUMENTED: ICD-10-PCS | Mod: CPTII,S$GLB,, | Performed by: NURSE PRACTITIONER

## 2021-05-12 PROCEDURE — U0002 COVID-19 LAB TEST NON-CDC: HCPCS | Mod: QW,S$GLB,, | Performed by: NURSE PRACTITIONER

## 2021-05-12 PROCEDURE — 99214 PR OFFICE/OUTPT VISIT, EST, LEVL IV, 30-39 MIN: ICD-10-PCS | Mod: S$GLB,,, | Performed by: NURSE PRACTITIONER

## 2021-05-12 PROCEDURE — 99214 OFFICE O/P EST MOD 30 MIN: CPT | Mod: S$GLB,,, | Performed by: NURSE PRACTITIONER

## 2021-05-12 PROCEDURE — 1125F AMNT PAIN NOTED PAIN PRSNT: CPT | Mod: S$GLB,,, | Performed by: NURSE PRACTITIONER

## 2021-05-12 RX ORDER — ERGOCALCIFEROL 1.25 MG/1
50000 CAPSULE ORAL
COMMUNITY
End: 2023-05-29

## 2021-05-12 RX ORDER — FLUTICASONE PROPIONATE 50 MCG
2 SPRAY, SUSPENSION (ML) NASAL DAILY
Qty: 9.9 ML | Refills: 0 | Status: SHIPPED | OUTPATIENT
Start: 2021-05-12 | End: 2023-05-29

## 2021-05-12 RX ORDER — FLUOXETINE HYDROCHLORIDE 20 MG/1
20 CAPSULE ORAL EVERY MORNING
COMMUNITY
Start: 2021-03-30 | End: 2023-05-29

## 2021-05-12 RX ORDER — CETIRIZINE HYDROCHLORIDE 10 MG/1
10 TABLET ORAL NIGHTLY
Qty: 7 TABLET | Refills: 0 | Status: SHIPPED | OUTPATIENT
Start: 2021-05-12 | End: 2023-05-29

## 2021-05-12 RX ORDER — BUPROPION HYDROCHLORIDE 300 MG/1
300 TABLET ORAL NIGHTLY
COMMUNITY
Start: 2021-04-10 | End: 2023-05-29

## 2021-05-12 RX ORDER — BENZONATATE 100 MG/1
200 CAPSULE ORAL 3 TIMES DAILY PRN
Qty: 30 CAPSULE | Refills: 0 | Status: SHIPPED | OUTPATIENT
Start: 2021-05-12 | End: 2021-05-22

## 2021-07-26 ENCOUNTER — OFFICE VISIT (OUTPATIENT)
Dept: URGENT CARE | Facility: CLINIC | Age: 26
End: 2021-07-26
Payer: COMMERCIAL

## 2021-07-26 VITALS
RESPIRATION RATE: 16 BRPM | DIASTOLIC BLOOD PRESSURE: 84 MMHG | HEIGHT: 72 IN | BODY MASS INDEX: 25.73 KG/M2 | SYSTOLIC BLOOD PRESSURE: 139 MMHG | HEART RATE: 87 BPM | OXYGEN SATURATION: 95 % | WEIGHT: 190 LBS | TEMPERATURE: 98 F

## 2021-07-26 DIAGNOSIS — M79.641 PAIN OF RIGHT HAND: Primary | ICD-10-CM

## 2021-07-26 DIAGNOSIS — M10.9 ACUTE GOUT OF RIGHT HAND, UNSPECIFIED CAUSE: ICD-10-CM

## 2021-07-26 PROCEDURE — 1160F RVW MEDS BY RX/DR IN RCRD: CPT | Mod: CPTII,S$GLB,, | Performed by: NURSE PRACTITIONER

## 2021-07-26 PROCEDURE — 1159F MED LIST DOCD IN RCRD: CPT | Mod: CPTII,S$GLB,, | Performed by: NURSE PRACTITIONER

## 2021-07-26 PROCEDURE — 96372 PR INJECTION,THERAP/PROPH/DIAG2ST, IM OR SUBCUT: ICD-10-PCS | Mod: S$GLB,,, | Performed by: NURSE PRACTITIONER

## 2021-07-26 PROCEDURE — 3008F BODY MASS INDEX DOCD: CPT | Mod: CPTII,S$GLB,, | Performed by: NURSE PRACTITIONER

## 2021-07-26 PROCEDURE — 3008F PR BODY MASS INDEX (BMI) DOCUMENTED: ICD-10-PCS | Mod: CPTII,S$GLB,, | Performed by: NURSE PRACTITIONER

## 2021-07-26 PROCEDURE — 73130 XR HAND COMPLETE 3 VIEW RIGHT: ICD-10-PCS | Mod: FY,RT,S$GLB, | Performed by: RADIOLOGY

## 2021-07-26 PROCEDURE — 73130 X-RAY EXAM OF HAND: CPT | Mod: FY,RT,S$GLB, | Performed by: RADIOLOGY

## 2021-07-26 PROCEDURE — 99214 PR OFFICE/OUTPT VISIT, EST, LEVL IV, 30-39 MIN: ICD-10-PCS | Mod: 25,S$GLB,, | Performed by: NURSE PRACTITIONER

## 2021-07-26 PROCEDURE — 96372 THER/PROPH/DIAG INJ SC/IM: CPT | Mod: S$GLB,,, | Performed by: NURSE PRACTITIONER

## 2021-07-26 PROCEDURE — 1159F PR MEDICATION LIST DOCUMENTED IN MEDICAL RECORD: ICD-10-PCS | Mod: CPTII,S$GLB,, | Performed by: NURSE PRACTITIONER

## 2021-07-26 PROCEDURE — 99214 OFFICE O/P EST MOD 30 MIN: CPT | Mod: 25,S$GLB,, | Performed by: NURSE PRACTITIONER

## 2021-07-26 PROCEDURE — 1160F PR REVIEW ALL MEDS BY PRESCRIBER/CLIN PHARMACIST DOCUMENTED: ICD-10-PCS | Mod: CPTII,S$GLB,, | Performed by: NURSE PRACTITIONER

## 2021-07-26 RX ORDER — DEXAMETHASONE SODIUM PHOSPHATE 100 MG/10ML
10 INJECTION INTRAMUSCULAR; INTRAVENOUS
Status: COMPLETED | OUTPATIENT
Start: 2021-07-26 | End: 2021-07-26

## 2021-07-26 RX ORDER — INDOMETHACIN 50 MG/1
50 CAPSULE ORAL 3 TIMES DAILY
Qty: 30 CAPSULE | Refills: 0 | Status: SHIPPED | OUTPATIENT
Start: 2021-07-26 | End: 2021-11-07

## 2021-07-26 RX ADMIN — DEXAMETHASONE SODIUM PHOSPHATE 10 MG: 100 INJECTION INTRAMUSCULAR; INTRAVENOUS at 02:07

## 2021-11-07 ENCOUNTER — OFFICE VISIT (OUTPATIENT)
Dept: URGENT CARE | Facility: CLINIC | Age: 26
End: 2021-11-07
Payer: COMMERCIAL

## 2021-11-07 VITALS
TEMPERATURE: 98 F | HEIGHT: 72 IN | WEIGHT: 190 LBS | SYSTOLIC BLOOD PRESSURE: 132 MMHG | OXYGEN SATURATION: 99 % | RESPIRATION RATE: 16 BRPM | BODY MASS INDEX: 25.73 KG/M2 | DIASTOLIC BLOOD PRESSURE: 73 MMHG | HEART RATE: 96 BPM

## 2021-11-07 DIAGNOSIS — S93.491A SPRAIN OF ANTERIOR TALOFIBULAR LIGAMENT OF RIGHT ANKLE, INITIAL ENCOUNTER: ICD-10-CM
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
## 2021-11-07 PROCEDURE — 3078F DIAST BP <80 MM HG: CPT | Mod: CPTII,S$GLB,,

## 2021-11-07 PROCEDURE — 1160F RVW MEDS BY RX/DR IN RCRD: CPT | Mod: CPTII,S$GLB,,

## 2021-11-07 PROCEDURE — 3008F BODY MASS INDEX DOCD: CPT | Mod: CPTII,S$GLB,,

## 2021-11-07 PROCEDURE — 3075F SYST BP GE 130 - 139MM HG: CPT | Mod: CPTII,S$GLB,,

## 2021-11-07 PROCEDURE — 1160F PR REVIEW ALL MEDS BY PRESCRIBER/CLIN PHARMACIST DOCUMENTED: ICD-10-PCS | Mod: CPTII,S$GLB,,

## 2021-11-07 PROCEDURE — 3075F PR MOST RECENT SYSTOLIC BLOOD PRESS GE 130-139MM HG: ICD-10-PCS | Mod: CPTII,S$GLB,,

## 2021-11-07 PROCEDURE — 99213 OFFICE O/P EST LOW 20 MIN: CPT | Mod: S$GLB,,,

## 2021-11-07 PROCEDURE — 73610 XR ANKLE COMPLETE 3 VIEW RIGHT: ICD-10-PCS | Mod: FY,RT,S$GLB, | Performed by: RADIOLOGY

## 2021-11-07 PROCEDURE — 3008F PR BODY MASS INDEX (BMI) DOCUMENTED: ICD-10-PCS | Mod: CPTII,S$GLB,,

## 2021-11-07 PROCEDURE — 99213 PR OFFICE/OUTPT VISIT, EST, LEVL III, 20-29 MIN: ICD-10-PCS | Mod: S$GLB,,,

## 2021-11-07 PROCEDURE — 3078F PR MOST RECENT DIASTOLIC BLOOD PRESSURE < 80 MM HG: ICD-10-PCS | Mod: CPTII,S$GLB,,

## 2021-11-07 PROCEDURE — 73610 X-RAY EXAM OF ANKLE: CPT | Mod: FY,RT,S$GLB, | Performed by: RADIOLOGY

## 2021-11-07 PROCEDURE — 1159F PR MEDICATION LIST DOCUMENTED IN MEDICAL RECORD: ICD-10-PCS | Mod: CPTII,S$GLB,,

## 2021-11-07 PROCEDURE — 1159F MED LIST DOCD IN RCRD: CPT | Mod: CPTII,S$GLB,,

## 2021-11-07 RX ORDER — IBUPROFEN 800 MG/1
800 TABLET ORAL 3 TIMES DAILY
Qty: 21 TABLET | Refills: 0 | Status: SHIPPED | OUTPATIENT
Start: 2021-11-07 | End: 2021-11-14

## 2021-11-10 ENCOUNTER — TELEPHONE (OUTPATIENT)
Dept: URGENT CARE | Facility: CLINIC | Age: 26
End: 2021-11-10
Payer: COMMERCIAL

## 2022-08-30 ENCOUNTER — OFFICE VISIT (OUTPATIENT)
Dept: URGENT CARE | Facility: CLINIC | Age: 27
End: 2022-08-30
Payer: COMMERCIAL

## 2022-08-30 VITALS
DIASTOLIC BLOOD PRESSURE: 85 MMHG | WEIGHT: 190 LBS | HEART RATE: 85 BPM | RESPIRATION RATE: 17 BRPM | TEMPERATURE: 98 F | SYSTOLIC BLOOD PRESSURE: 134 MMHG | HEIGHT: 72 IN | BODY MASS INDEX: 25.73 KG/M2 | OXYGEN SATURATION: 97 %

## 2022-08-30 DIAGNOSIS — J02.9 SORE THROAT: Primary | ICD-10-CM

## 2022-08-30 DIAGNOSIS — J06.9 URI, ACUTE: ICD-10-CM

## 2022-08-30 LAB
CTP QC/QA: YES
SARS-COV-2 RDRP RESP QL NAA+PROBE: NEGATIVE

## 2022-08-30 PROCEDURE — 99213 PR OFFICE/OUTPT VISIT, EST, LEVL III, 20-29 MIN: ICD-10-PCS | Mod: S$GLB,,, | Performed by: FAMILY MEDICINE

## 2022-08-30 PROCEDURE — 3079F PR MOST RECENT DIASTOLIC BLOOD PRESSURE 80-89 MM HG: ICD-10-PCS | Mod: CPTII,S$GLB,, | Performed by: FAMILY MEDICINE

## 2022-08-30 PROCEDURE — 3075F SYST BP GE 130 - 139MM HG: CPT | Mod: CPTII,S$GLB,, | Performed by: FAMILY MEDICINE

## 2022-08-30 PROCEDURE — 3008F PR BODY MASS INDEX (BMI) DOCUMENTED: ICD-10-PCS | Mod: CPTII,S$GLB,, | Performed by: FAMILY MEDICINE

## 2022-08-30 PROCEDURE — 3008F BODY MASS INDEX DOCD: CPT | Mod: CPTII,S$GLB,, | Performed by: FAMILY MEDICINE

## 2022-08-30 PROCEDURE — U0002: ICD-10-PCS | Mod: QW,S$GLB,, | Performed by: FAMILY MEDICINE

## 2022-08-30 PROCEDURE — U0002 COVID-19 LAB TEST NON-CDC: HCPCS | Mod: QW,S$GLB,, | Performed by: FAMILY MEDICINE

## 2022-08-30 PROCEDURE — 3079F DIAST BP 80-89 MM HG: CPT | Mod: CPTII,S$GLB,, | Performed by: FAMILY MEDICINE

## 2022-08-30 PROCEDURE — 3075F PR MOST RECENT SYSTOLIC BLOOD PRESS GE 130-139MM HG: ICD-10-PCS | Mod: CPTII,S$GLB,, | Performed by: FAMILY MEDICINE

## 2022-08-30 PROCEDURE — 1159F MED LIST DOCD IN RCRD: CPT | Mod: CPTII,S$GLB,, | Performed by: FAMILY MEDICINE

## 2022-08-30 PROCEDURE — 1159F PR MEDICATION LIST DOCUMENTED IN MEDICAL RECORD: ICD-10-PCS | Mod: CPTII,S$GLB,, | Performed by: FAMILY MEDICINE

## 2022-08-30 PROCEDURE — 99213 OFFICE O/P EST LOW 20 MIN: CPT | Mod: S$GLB,,, | Performed by: FAMILY MEDICINE

## 2022-08-30 RX ORDER — LORATADINE 10 MG/1
10 TABLET ORAL DAILY
Qty: 30 TABLET | Refills: 2 | Status: SHIPPED | OUTPATIENT
Start: 2022-08-30 | End: 2023-05-29

## 2022-08-30 NOTE — LETTER
August 30, 2022      [facility]  [address] [address]  Phone: [phone]  Fax: [phone]       Patient: Yossi James   YOB: 1995  Date of Visit: 08/30/2022    To Whom It May Concern:    Jan James  was at [facility] on 08/30/2022. The patient may return to work/school on 9/1/122   with no restrictions. If you have any questions or concerns, or if I can be of further assistance, please do not hesitate to contact me.    Sincerely,    Genesis Lezama MD

## 2022-08-30 NOTE — PROGRESS NOTES
Subjective:       Patient ID: Yossi James is a 26 y.o. male.    Vitals:  height is 6' (1.829 m) and weight is 86.2 kg (190 lb). His oral temperature is 98 °F (36.7 °C). His blood pressure is 134/85 and his pulse is 85. His respiration is 17 and oxygen saturation is 97%.     Chief Complaint: Sore Throat    26 yr old male came in with complaints of of a sore throat, headache, and a cough. He says the sore throat and headache have gotten much better but the cough is still lingering. His symptoms started yesterday morning.   Works for [employer]    Vaccinated      Sore Throat   This is a new problem. The current episode started yesterday. The problem has been gradually improving. There has been no fever. The pain is at a severity of 0/10. The patient is experiencing no pain. Associated symptoms include coughing. Pertinent negatives include no abdominal pain, congestion, diarrhea, drooling, ear discharge, ear pain, headaches, hoarse voice, plugged ear sensation, neck pain, shortness of breath, stridor, swollen glands, trouble swallowing or vomiting. He has tried nothing for the symptoms.     HENT:  Positive for sore throat. Negative for ear pain, ear discharge, drooling, congestion and trouble swallowing.    Neck: Negative for neck pain.   Respiratory:  Positive for cough. Negative for shortness of breath and stridor.    Gastrointestinal:  Negative for abdominal pain, vomiting and diarrhea.   Neurological:  Negative for headaches.     Objective:      Physical Exam   Constitutional: He is oriented to person, place, and time. He appears well-developed. He is cooperative.  Non-toxic appearance. He does not appear ill. No distress.   HENT:   Head: Normocephalic and atraumatic.   Ears:   Right Ear: Hearing, tympanic membrane, external ear and ear canal normal.   Left Ear: Hearing, tympanic membrane, external ear and ear canal normal.   Nose: Nose normal. No mucosal edema, rhinorrhea or nasal deformity. No epistaxis. Right  sinus exhibits no maxillary sinus tenderness and no frontal sinus tenderness. Left sinus exhibits no maxillary sinus tenderness and no frontal sinus tenderness.   Mouth/Throat: Uvula is midline, oropharynx is clear and moist and mucous membranes are normal. Mucous membranes are moist. No trismus in the jaw. Normal dentition. No uvula swelling. No posterior oropharyngeal erythema. Oropharynx is clear.   Eyes: Conjunctivae and lids are normal. Pupils are equal, round, and reactive to light. Right eye exhibits no discharge. Left eye exhibits no discharge. No scleral icterus. Extraocular movement intact   Neck: Trachea normal and phonation normal. Neck supple.   Cardiovascular: Normal rate, regular rhythm, normal heart sounds and normal pulses.   Pulmonary/Chest: Effort normal and breath sounds normal. No respiratory distress.   Abdominal: Normal appearance and bowel sounds are normal. He exhibits no distension and no mass. Soft. There is no abdominal tenderness.   Musculoskeletal: Normal range of motion.         General: No deformity. Normal range of motion.   Neurological: He is alert and oriented to person, place, and time. He exhibits normal muscle tone. Coordination normal.   Skin: Skin is warm, dry, intact, not diaphoretic and not pale.   Psychiatric: His speech is normal and behavior is normal. Judgment and thought content normal.   Nursing note and vitals reviewed.      Assessment:       1. Sore throat    2. URI, acute            Plan:         Sore throat  -     POCT COVID-19 Rapid Screening    URI, acute             Results for orders placed or performed in visit on 08/30/22   POCT COVID-19 Rapid Screening   Result Value Ref Range    POC Rapid COVID Negative Negative     Acceptable Yes

## 2022-09-01 ENCOUNTER — OFFICE VISIT (OUTPATIENT)
Dept: URGENT CARE | Facility: CLINIC | Age: 27
End: 2022-09-01
Payer: COMMERCIAL

## 2022-09-01 VITALS
WEIGHT: 187 LBS | SYSTOLIC BLOOD PRESSURE: 128 MMHG | RESPIRATION RATE: 18 BRPM | TEMPERATURE: 99 F | OXYGEN SATURATION: 98 % | HEART RATE: 94 BPM | BODY MASS INDEX: 25.33 KG/M2 | HEIGHT: 72 IN | DIASTOLIC BLOOD PRESSURE: 83 MMHG

## 2022-09-01 DIAGNOSIS — Z02.89 ENCOUNTER TO OBTAIN EXCUSE FROM WORK: Primary | ICD-10-CM

## 2022-09-01 DIAGNOSIS — R11.0 NAUSEA: ICD-10-CM

## 2022-09-01 DIAGNOSIS — R09.81 CONGESTION OF NASAL SINUS: ICD-10-CM

## 2022-09-01 LAB
CTP QC/QA: YES
SARS-COV-2 RDRP RESP QL NAA+PROBE: NEGATIVE

## 2022-09-01 PROCEDURE — U0002 COVID-19 LAB TEST NON-CDC: HCPCS | Mod: QW,S$GLB,, | Performed by: NURSE PRACTITIONER

## 2022-09-01 PROCEDURE — 3079F DIAST BP 80-89 MM HG: CPT | Mod: CPTII,S$GLB,, | Performed by: NURSE PRACTITIONER

## 2022-09-01 PROCEDURE — 1160F RVW MEDS BY RX/DR IN RCRD: CPT | Mod: CPTII,S$GLB,, | Performed by: NURSE PRACTITIONER

## 2022-09-01 PROCEDURE — 99213 PR OFFICE/OUTPT VISIT, EST, LEVL III, 20-29 MIN: ICD-10-PCS | Mod: S$GLB,,, | Performed by: NURSE PRACTITIONER

## 2022-09-01 PROCEDURE — S0119 ONDANSETRON 4 MG: HCPCS | Mod: S$GLB,,, | Performed by: NURSE PRACTITIONER

## 2022-09-01 PROCEDURE — 1160F PR REVIEW ALL MEDS BY PRESCRIBER/CLIN PHARMACIST DOCUMENTED: ICD-10-PCS | Mod: CPTII,S$GLB,, | Performed by: NURSE PRACTITIONER

## 2022-09-01 PROCEDURE — 1159F PR MEDICATION LIST DOCUMENTED IN MEDICAL RECORD: ICD-10-PCS | Mod: CPTII,S$GLB,, | Performed by: NURSE PRACTITIONER

## 2022-09-01 PROCEDURE — U0002: ICD-10-PCS | Mod: QW,S$GLB,, | Performed by: NURSE PRACTITIONER

## 2022-09-01 PROCEDURE — 3079F PR MOST RECENT DIASTOLIC BLOOD PRESSURE 80-89 MM HG: ICD-10-PCS | Mod: CPTII,S$GLB,, | Performed by: NURSE PRACTITIONER

## 2022-09-01 PROCEDURE — 3008F PR BODY MASS INDEX (BMI) DOCUMENTED: ICD-10-PCS | Mod: CPTII,S$GLB,, | Performed by: NURSE PRACTITIONER

## 2022-09-01 PROCEDURE — 1159F MED LIST DOCD IN RCRD: CPT | Mod: CPTII,S$GLB,, | Performed by: NURSE PRACTITIONER

## 2022-09-01 PROCEDURE — S0119 PR ONDANSETRON, ORAL, 4MG: ICD-10-PCS | Mod: S$GLB,,, | Performed by: NURSE PRACTITIONER

## 2022-09-01 PROCEDURE — 3008F BODY MASS INDEX DOCD: CPT | Mod: CPTII,S$GLB,, | Performed by: NURSE PRACTITIONER

## 2022-09-01 PROCEDURE — 99213 OFFICE O/P EST LOW 20 MIN: CPT | Mod: S$GLB,,, | Performed by: NURSE PRACTITIONER

## 2022-09-01 PROCEDURE — 3074F PR MOST RECENT SYSTOLIC BLOOD PRESSURE < 130 MM HG: ICD-10-PCS | Mod: CPTII,S$GLB,, | Performed by: NURSE PRACTITIONER

## 2022-09-01 PROCEDURE — 3074F SYST BP LT 130 MM HG: CPT | Mod: CPTII,S$GLB,, | Performed by: NURSE PRACTITIONER

## 2022-09-01 RX ORDER — ONDANSETRON 4 MG/1
4 TABLET, ORALLY DISINTEGRATING ORAL 2 TIMES DAILY
Qty: 12 TABLET | Refills: 0 | Status: SHIPPED | OUTPATIENT
Start: 2022-09-01 | End: 2023-05-29

## 2022-09-01 RX ORDER — ONDANSETRON 4 MG/1
4 TABLET, ORALLY DISINTEGRATING ORAL
Status: COMPLETED | OUTPATIENT
Start: 2022-09-01 | End: 2022-09-01

## 2022-09-01 RX ADMIN — ONDANSETRON 4 MG: 4 TABLET, ORALLY DISINTEGRATING ORAL at 10:09

## 2022-09-01 NOTE — LETTER
September 1, 2022      [facility]  [address] [address]  Phone: [phone]  Fax: [phone]       Patient: Yossi James   YOB: 1995  Date of Visit: 09/01/2022    To Whom It May Concern:    Jan James  was at [facility] on 09/01/2022. The patient may return to work/school on 09/02/2022 with no restrictions. If you have any questions or concerns, or if I can be of further assistance, please do not hesitate to contact me.    Sincerely,    Shiela Guan, NP

## 2022-09-01 NOTE — PROGRESS NOTES
Subjective:       Patient ID: Yossi James is a 26 y.o. male.    Vitals:  height is 6' (1.829 m) and weight is 84.8 kg (187 lb). His oral temperature is 98.5 °F (36.9 °C). His blood pressure is 128/83 and his pulse is 94. His respiration is 18 and oxygen saturation is 98%.     Chief Complaint: Nasal Congestion (I have a cough, felt nauseous last night and didn't sleep much, and might have a fever. - Entered by patient) and Sinus Problem (3 days)    All symptoms have improved since last visit x2 days ago. + nausea and only 3hrs of sleep last night, requesting a work note because he feels it is unsafe to drive on only 3hrs of sleep.     Sinus Problem  This is a recurrent problem. The current episode started in the past 7 days. The problem is unchanged. There has been no fever. Associated symptoms include congestion and coughing. Pertinent negatives include no headaches, sneezing or sore throat. Past treatments include oral decongestants. The treatment provided moderate relief.     HENT:  Positive for congestion. Negative for sore throat.    Respiratory:  Positive for cough.    Gastrointestinal:  Positive for nausea.   Allergic/Immunologic: Negative for sneezing.   Neurological:  Negative for headaches.     Objective:      Physical Exam   Constitutional: He is oriented to person, place, and time. He appears well-developed. He is cooperative.  Non-toxic appearance. He does not appear ill. No distress.   HENT:   Head: Normocephalic and atraumatic.   Ears:   Right Ear: Hearing, tympanic membrane, external ear and ear canal normal.   Left Ear: Hearing, tympanic membrane, external ear and ear canal normal.   Nose: Nose normal. No mucosal edema, rhinorrhea or nasal deformity. No epistaxis. Right sinus exhibits no maxillary sinus tenderness and no frontal sinus tenderness. Left sinus exhibits no maxillary sinus tenderness and no frontal sinus tenderness.   Mouth/Throat: Uvula is midline, oropharynx is clear and moist  and mucous membranes are normal. No trismus in the jaw. Normal dentition. No uvula swelling. No oropharyngeal exudate, posterior oropharyngeal edema or posterior oropharyngeal erythema.   Eyes: Conjunctivae and lids are normal. No scleral icterus.   Neck: Trachea normal and phonation normal. Neck supple. No edema present. No erythema present. No neck rigidity present.   Cardiovascular: Normal rate, regular rhythm, normal heart sounds and normal pulses.   Pulmonary/Chest: Effort normal and breath sounds normal. No respiratory distress. He has no decreased breath sounds. He has no rhonchi.   Abdominal: Normal appearance.   Musculoskeletal: Normal range of motion.         General: No deformity. Normal range of motion.   Neurological: He is alert and oriented to person, place, and time. He exhibits normal muscle tone. Coordination normal.   Skin: Skin is warm, dry, intact, not diaphoretic and not pale.   Psychiatric: His speech is normal and behavior is normal. Judgment and thought content normal.   Nursing note and vitals reviewed.      Assessment:       1. Encounter to obtain excuse from work    2. Congestion of nasal sinus    3. Nausea            Plan:     Follow up with PCP PRN.     Encounter to obtain excuse from work    Congestion of nasal sinus  -     POCT COVID-19 Rapid Screening    Nausea    Other orders  -     ondansetron disintegrating tablet 4 mg  -     ondansetron (ZOFRAN-ODT) 4 MG TbDL; Take 1 tablet (4 mg total) by mouth 2 (two) times daily.  Dispense: 12 tablet; Refill: 0

## 2023-05-29 ENCOUNTER — OFFICE VISIT (OUTPATIENT)
Dept: FAMILY MEDICINE | Facility: CLINIC | Age: 28
End: 2023-05-29
Payer: COMMERCIAL

## 2023-05-29 ENCOUNTER — LAB VISIT (OUTPATIENT)
Dept: LAB | Facility: HOSPITAL | Age: 28
End: 2023-05-29
Attending: FAMILY MEDICINE
Payer: COMMERCIAL

## 2023-05-29 VITALS
DIASTOLIC BLOOD PRESSURE: 80 MMHG | OXYGEN SATURATION: 98 % | HEIGHT: 72 IN | HEART RATE: 96 BPM | WEIGHT: 188.06 LBS | SYSTOLIC BLOOD PRESSURE: 128 MMHG | BODY MASS INDEX: 25.47 KG/M2

## 2023-05-29 DIAGNOSIS — Z11.59 NEED FOR HEPATITIS C SCREENING TEST: ICD-10-CM

## 2023-05-29 DIAGNOSIS — Z11.3 ROUTINE SCREENING FOR STI (SEXUALLY TRANSMITTED INFECTION): ICD-10-CM

## 2023-05-29 DIAGNOSIS — Z00.01 ENCOUNTER FOR GENERAL ADULT MEDICAL EXAMINATION WITH ABNORMAL FINDINGS: Primary | ICD-10-CM

## 2023-05-29 DIAGNOSIS — Z86.59 HISTORY OF DEPRESSION: ICD-10-CM

## 2023-05-29 DIAGNOSIS — Z00.01 ENCOUNTER FOR GENERAL ADULT MEDICAL EXAMINATION WITH ABNORMAL FINDINGS: ICD-10-CM

## 2023-05-29 PROBLEM — M17.9 OSTEOARTHRITIS OF KNEE: Status: RESOLVED | Noted: 2019-01-21 | Resolved: 2023-05-29

## 2023-05-29 PROBLEM — Z86.19 HISTORY OF HERPES LABIALIS: Status: ACTIVE | Noted: 2019-08-06

## 2023-05-29 LAB
ALBUMIN SERPL BCP-MCNC: 4.2 G/DL (ref 3.5–5.2)
ALBUMIN SERPL BCP-MCNC: 4.2 G/DL (ref 3.5–5.2)
ALP SERPL-CCNC: 48 U/L (ref 55–135)
ALT SERPL W/O P-5'-P-CCNC: 18 U/L (ref 10–44)
ANION GAP SERPL CALC-SCNC: 12 MMOL/L (ref 8–16)
AST SERPL-CCNC: 20 U/L (ref 10–40)
BASOPHILS # BLD AUTO: 0.01 K/UL (ref 0–0.2)
BASOPHILS NFR BLD: 0.2 % (ref 0–1.9)
BILIRUB DIRECT SERPL-MCNC: 0.2 MG/DL (ref 0.1–0.3)
BILIRUB SERPL-MCNC: 0.4 MG/DL (ref 0.1–1)
BUN SERPL-MCNC: 13 MG/DL (ref 6–20)
CALCIUM SERPL-MCNC: 10 MG/DL (ref 8.7–10.5)
CHLORIDE SERPL-SCNC: 104 MMOL/L (ref 95–110)
CHOLEST SERPL-MCNC: 189 MG/DL (ref 120–199)
CHOLEST/HDLC SERPL: 2.6 {RATIO} (ref 2–5)
CO2 SERPL-SCNC: 24 MMOL/L (ref 23–29)
CREAT SERPL-MCNC: 0.9 MG/DL (ref 0.5–1.4)
DIFFERENTIAL METHOD: ABNORMAL
EOSINOPHIL # BLD AUTO: 0 K/UL (ref 0–0.5)
EOSINOPHIL NFR BLD: 0.9 % (ref 0–8)
ERYTHROCYTE [DISTWIDTH] IN BLOOD BY AUTOMATED COUNT: 12.1 % (ref 11.5–14.5)
EST. GFR  (NO RACE VARIABLE): >60 ML/MIN/1.73 M^2
ESTIMATED AVG GLUCOSE: 100 MG/DL (ref 68–131)
GLUCOSE SERPL-MCNC: 82 MG/DL (ref 70–110)
HBA1C MFR BLD: 5.1 % (ref 4–5.6)
HCT VFR BLD AUTO: 45.2 % (ref 40–54)
HCV AB SERPL QL IA: NORMAL
HDLC SERPL-MCNC: 72 MG/DL (ref 40–75)
HDLC SERPL: 38.1 % (ref 20–50)
HGB BLD-MCNC: 14.8 G/DL (ref 14–18)
HIV 1+2 AB+HIV1 P24 AG SERPL QL IA: NORMAL
IMM GRANULOCYTES # BLD AUTO: 0.02 K/UL (ref 0–0.04)
IMM GRANULOCYTES NFR BLD AUTO: 0.5 % (ref 0–0.5)
LDLC SERPL CALC-MCNC: 105.6 MG/DL (ref 63–159)
LYMPHOCYTES # BLD AUTO: 1.4 K/UL (ref 1–4.8)
LYMPHOCYTES NFR BLD: 32.5 % (ref 18–48)
MCH RBC QN AUTO: 32.6 PG (ref 27–31)
MCHC RBC AUTO-ENTMCNC: 32.7 G/DL (ref 32–36)
MCV RBC AUTO: 100 FL (ref 82–98)
MONOCYTES # BLD AUTO: 0.3 K/UL (ref 0.3–1)
MONOCYTES NFR BLD: 7.8 % (ref 4–15)
NEUTROPHILS # BLD AUTO: 2.5 K/UL (ref 1.8–7.7)
NEUTROPHILS NFR BLD: 58.1 % (ref 38–73)
NONHDLC SERPL-MCNC: 117 MG/DL
NRBC BLD-RTO: 0 /100 WBC
PHOSPHATE SERPL-MCNC: 2.7 MG/DL (ref 2.7–4.5)
PLATELET # BLD AUTO: 237 K/UL (ref 150–450)
PMV BLD AUTO: 10.3 FL (ref 9.2–12.9)
POTASSIUM SERPL-SCNC: 4.3 MMOL/L (ref 3.5–5.1)
PROT SERPL-MCNC: 7.4 G/DL (ref 6–8.4)
RBC # BLD AUTO: 4.54 M/UL (ref 4.6–6.2)
SODIUM SERPL-SCNC: 140 MMOL/L (ref 136–145)
TRIGL SERPL-MCNC: 57 MG/DL (ref 30–150)
TSH SERPL DL<=0.005 MIU/L-ACNC: 1.53 UIU/ML (ref 0.4–4)
WBC # BLD AUTO: 4.37 K/UL (ref 3.9–12.7)

## 2023-05-29 PROCEDURE — 1159F PR MEDICATION LIST DOCUMENTED IN MEDICAL RECORD: ICD-10-PCS | Mod: CPTII,S$GLB,, | Performed by: FAMILY MEDICINE

## 2023-05-29 PROCEDURE — 3074F PR MOST RECENT SYSTOLIC BLOOD PRESSURE < 130 MM HG: ICD-10-PCS | Mod: CPTII,S$GLB,, | Performed by: FAMILY MEDICINE

## 2023-05-29 PROCEDURE — 84443 ASSAY THYROID STIM HORMONE: CPT | Performed by: FAMILY MEDICINE

## 2023-05-29 PROCEDURE — 80061 LIPID PANEL: CPT | Performed by: FAMILY MEDICINE

## 2023-05-29 PROCEDURE — 1159F MED LIST DOCD IN RCRD: CPT | Mod: CPTII,S$GLB,, | Performed by: FAMILY MEDICINE

## 2023-05-29 PROCEDURE — 80069 RENAL FUNCTION PANEL: CPT | Performed by: FAMILY MEDICINE

## 2023-05-29 PROCEDURE — 3008F BODY MASS INDEX DOCD: CPT | Mod: CPTII,S$GLB,, | Performed by: FAMILY MEDICINE

## 2023-05-29 PROCEDURE — 84075 ASSAY ALKALINE PHOSPHATASE: CPT | Performed by: FAMILY MEDICINE

## 2023-05-29 PROCEDURE — 1160F RVW MEDS BY RX/DR IN RCRD: CPT | Mod: CPTII,S$GLB,, | Performed by: FAMILY MEDICINE

## 2023-05-29 PROCEDURE — 86592 SYPHILIS TEST NON-TREP QUAL: CPT | Performed by: FAMILY MEDICINE

## 2023-05-29 PROCEDURE — 99395 PREV VISIT EST AGE 18-39: CPT | Mod: S$GLB,,, | Performed by: FAMILY MEDICINE

## 2023-05-29 PROCEDURE — 3079F DIAST BP 80-89 MM HG: CPT | Mod: CPTII,S$GLB,, | Performed by: FAMILY MEDICINE

## 2023-05-29 PROCEDURE — 3008F PR BODY MASS INDEX (BMI) DOCUMENTED: ICD-10-PCS | Mod: CPTII,S$GLB,, | Performed by: FAMILY MEDICINE

## 2023-05-29 PROCEDURE — 99999 PR PBB SHADOW E&M-EST. PATIENT-LVL III: CPT | Mod: PBBFAC,,, | Performed by: FAMILY MEDICINE

## 2023-05-29 PROCEDURE — 3074F SYST BP LT 130 MM HG: CPT | Mod: CPTII,S$GLB,, | Performed by: FAMILY MEDICINE

## 2023-05-29 PROCEDURE — 86803 HEPATITIS C AB TEST: CPT | Performed by: FAMILY MEDICINE

## 2023-05-29 PROCEDURE — 1160F PR REVIEW ALL MEDS BY PRESCRIBER/CLIN PHARMACIST DOCUMENTED: ICD-10-PCS | Mod: CPTII,S$GLB,, | Performed by: FAMILY MEDICINE

## 2023-05-29 PROCEDURE — 87389 HIV-1 AG W/HIV-1&-2 AB AG IA: CPT | Performed by: FAMILY MEDICINE

## 2023-05-29 PROCEDURE — 36415 COLL VENOUS BLD VENIPUNCTURE: CPT | Mod: PO | Performed by: FAMILY MEDICINE

## 2023-05-29 PROCEDURE — 83036 HEMOGLOBIN GLYCOSYLATED A1C: CPT | Performed by: FAMILY MEDICINE

## 2023-05-29 PROCEDURE — 3079F PR MOST RECENT DIASTOLIC BLOOD PRESSURE 80-89 MM HG: ICD-10-PCS | Mod: CPTII,S$GLB,, | Performed by: FAMILY MEDICINE

## 2023-05-29 PROCEDURE — 99395 PR PREVENTIVE VISIT,EST,18-39: ICD-10-PCS | Mod: S$GLB,,, | Performed by: FAMILY MEDICINE

## 2023-05-29 PROCEDURE — 85025 COMPLETE CBC W/AUTO DIFF WBC: CPT | Performed by: FAMILY MEDICINE

## 2023-05-29 PROCEDURE — 99999 PR PBB SHADOW E&M-EST. PATIENT-LVL III: ICD-10-PCS | Mod: PBBFAC,,, | Performed by: FAMILY MEDICINE

## 2023-05-29 NOTE — PROGRESS NOTES
Subjective:       Patient ID: Yossi James is a 27 y.o. male.    Chief Complaint: Establish Care    Patient Active Problem List   Diagnosis    History of herpes labialis    History of depression      HPI  26 yo male presents today to establish care. , active with work. Has steady girlfriend. Family traveling to [place] next month. Here to check up that UTD on vaccinations. No prior known medication issues. Prior knee surgery (L) without ongoing issues at this time.   Prior herpes labialis outbreak about once per year. Has been a long time per patient.   History of depression and anxiety currently in remission.    Review of Systems   All other systems reviewed and are negative.       Objective:     Vitals:    05/29/23 0813   BP: 128/80   Pulse: 96     Physical Exam  Vitals and nursing note reviewed.   Constitutional:       General: He is not in acute distress.     Appearance: Normal appearance. He is not ill-appearing, toxic-appearing or diaphoretic.   HENT:      Head: Normocephalic and atraumatic.   Eyes:      General: No scleral icterus.     Conjunctiva/sclera: Conjunctivae normal.   Cardiovascular:      Rate and Rhythm: Normal rate.      Heart sounds: Normal heart sounds.   Pulmonary:      Effort: Pulmonary effort is normal. No respiratory distress.      Breath sounds: Normal breath sounds.   Skin:     Coloration: Skin is not pale.   Neurological:      Mental Status: He is alert. Mental status is at baseline.   Psychiatric:         Attention and Perception: Attention and perception normal.         Mood and Affect: Mood and affect normal.         Speech: Speech normal.         Behavior: Behavior normal.         Cognition and Memory: Cognition and memory normal.         Judgment: Judgment normal.     Assessment:       1. Encounter for general adult medical examination with abnormal findings    2. Routine screening for STI (sexually transmitted infection)    3. Need for hepatitis C screening test    4.  History of depression          Plan:   Recent relevant labs results reviewed with patient.       1. Encounter for general adult medical examination with abnormal findings  -     Urinalysis, Reflex to Urine Culture Urine, Clean Catch; Future; Expected date: 05/29/2023  -     Hepatic Function Panel; Future; Expected date: 05/29/2023  -     Renal Function Panel; Future; Expected date: 05/29/2023  -     Lipid Panel; Future; Expected date: 05/29/2023  -     TSH; Future; Expected date: 05/29/2023  -     Hemoglobin A1C; Future; Expected date: 05/29/2023  -     CBC Auto Differential; Future; Expected date: 05/29/2023  -     Urinalysis, Reflex to Urine Culture Urine, Clean Catch; Future; Expected date: 05/29/2023  -     Hepatic Function Panel; Future; Expected date: 05/29/2023  -     Renal Function Panel; Future; Expected date: 05/29/2023  -     Lipid Panel; Future; Expected date: 05/29/2023  -     TSH; Future; Expected date: 05/29/2023  -     Hemoglobin A1C; Future; Expected date: 05/29/2023  -     CBC Auto Differential; Future; Expected date: 05/29/2023  - Risk and age appropriate anticipatory guidance. HM reviewed and updated. Recommendations discussed with patient as appropriate.     - Vaccinations reviewed, UTD. Flu and possible COVID if needed in Fall.    2. Routine screening for STI (sexually transmitted infection)  -     C. trachomatis/N. gonorrhoeae by AMP DNA; Future  -     HIV 1/2 Ag/Ab (4th Gen); Future; Expected date: 05/29/2023  -     RPR; Future; Expected date: 05/29/2023    3. Need for hepatitis C screening test  -     Hepatitis C Antibody; Future; Expected date: 05/29/2023    4. History of depression  Wellbutrin (300mg) and Prozac (20mg) in past helpful.   - Currently in remission. Doing well.    Patient's questions answered. Plan reviewed with patient at the end of visit. Relevant precautions to chief complaint and reasons to seek further medical care or to contact the office sooner reviewed with patient.      Follow up in about 1 year (around 5/29/2024) for Annual Exam, Prelabs CBC, Renal & Hep Fxn, A1C, Lipid, TSH, UA.

## 2023-05-30 PROBLEM — D75.89 MACROCYTOSIS WITHOUT ANEMIA: Status: ACTIVE | Noted: 2023-05-30

## 2023-05-30 LAB — RPR SER QL: NORMAL

## 2024-03-23 ENCOUNTER — OFFICE VISIT (OUTPATIENT)
Dept: URGENT CARE | Facility: CLINIC | Age: 29
End: 2024-03-23
Payer: COMMERCIAL

## 2024-03-23 VITALS
WEIGHT: 188 LBS | HEIGHT: 72 IN | DIASTOLIC BLOOD PRESSURE: 86 MMHG | RESPIRATION RATE: 18 BRPM | SYSTOLIC BLOOD PRESSURE: 133 MMHG | OXYGEN SATURATION: 98 % | TEMPERATURE: 98 F | BODY MASS INDEX: 25.47 KG/M2 | HEART RATE: 83 BPM

## 2024-03-23 DIAGNOSIS — J00 ACUTE RHINITIS: Primary | ICD-10-CM

## 2024-03-23 DIAGNOSIS — J02.9 SORE THROAT: ICD-10-CM

## 2024-03-23 DIAGNOSIS — J06.9 VIRAL URI: ICD-10-CM

## 2024-03-23 DIAGNOSIS — H61.23 BILATERAL IMPACTED CERUMEN: ICD-10-CM

## 2024-03-23 LAB
CTP QC/QA: YES
SARS-COV-2 AG RESP QL IA.RAPID: NEGATIVE

## 2024-03-23 PROCEDURE — 99213 OFFICE O/P EST LOW 20 MIN: CPT | Mod: S$GLB,,,

## 2024-03-23 PROCEDURE — 87811 SARS-COV-2 COVID19 W/OPTIC: CPT | Mod: QW,S$GLB,,

## 2024-03-23 RX ORDER — CETIRIZINE HYDROCHLORIDE 10 MG/1
10 TABLET ORAL DAILY
Qty: 30 TABLET | Refills: 0 | Status: SHIPPED | OUTPATIENT
Start: 2024-03-23 | End: 2024-04-22

## 2024-03-23 RX ORDER — AZELASTINE 1 MG/ML
1 SPRAY, METERED NASAL DAILY
Qty: 30 ML | Refills: 0 | Status: SHIPPED | OUTPATIENT
Start: 2024-03-23 | End: 2025-03-23

## 2024-03-23 RX ORDER — FLUTICASONE PROPIONATE 50 MCG
1 SPRAY, SUSPENSION (ML) NASAL DAILY
Qty: 16 G | Refills: 0 | Status: SHIPPED | OUTPATIENT
Start: 2024-03-23 | End: 2024-04-02

## 2024-03-23 NOTE — PATIENT INSTRUCTIONS
Please drink plenty of fluids.  Please get plenty of rest.  Please return here or go to the Emergency Department for any concerns or worsening of condition.  Use azelastine/flonase nasal spray once daily ( patient will do 1 spray in each nostril of 1 nasal spray in the morning and 1 spray in each nostril the opposite nasal spray at night)  and take daily zyrtec as directed for ten days.  Instructed patient on day 5/10 if he prefers 1 nasal spray over the other than just to pick 1 nasal spray and do 1 spray in each nostril twice a day for the rest of the 10 day period.  Recommended taking vitamin D and zinc supplements for immune support.   Recommended for patient to drink hot tea with honey. Consider eating softer foods such as soup and broth for the next couple of days to prevent further throat irritation. Recommended for patient to refrain from acidic foods (such as tomatoes or caffeine) to prevent throat irritation for the next couple of days.   If you do not have Hypertension or any history of palpitations, it is ok to take over the counter Sudafed or Mucinex D or Allegra-D or Claritin-D or Zyrtec-D.  If you do take one of the above, it is ok to combine that with plain over the counter Mucinex or Allegra or Claritin or Zyrtec.  If for example you are taking Zyrtec -D, you can combine that with Mucinex, but not Mucinex-D.  If you are taking Mucinex-D, you can combine that with plain Allegra or Claritin or Zyrtec.   If you do have Hypertension or palpitations, it is safe to take Coricidin HBP for relief of sinus symptoms.  If not allergic, please take over the counter Tylenol (Acetaminophen) and/or Motrin (Ibuprofen) as directed for control of pain and/or fever.  Please follow up with your primary care doctor or specialist as needed.    If you  smoke, please stop smoking.

## 2024-03-23 NOTE — PROGRESS NOTES
Subjective:      Patient ID: Yossi James is a 28 y.o. male.    Vitals:  height is 6' (1.829 m) and weight is 85.3 kg (188 lb). His temperature is 98.4 °F (36.9 °C). His blood pressure is 133/86 and his pulse is 83. His respiration is 18 and oxygen saturation is 98%.     Chief Complaint: Sore Throat (Entered by patient)    28-year-old male presents to clinic today with chief complaint of sore throat, post nasal drip, cough, headache, trouble swallowing, nasal congestion and sweats.  Symptoms started day ago with the sore throat.  Patient has taken benadryl with no relief. Denies any recent ill exposures, but he states he does work for [employer] and comes across multiple people a day.  Denies any recent travel.  Denies history of seasonal allergies. Denies numbness or tingling. Denies radiation of pain. Denies fever, chills, body aches, chest pain, shortness of breath, abdominal pain, nausea, vomiting, diarrhea, or rashes.          Sore Throat   This is a new problem. The current episode started yesterday. The pain is at a severity of 7/10. The pain is moderate. Associated symptoms include congestion, coughing, headaches, swollen glands and trouble swallowing. Pertinent negatives include no abdominal pain, diarrhea, ear discharge, ear pain, neck pain, shortness of breath or vomiting. Treatments tried: benadryl. The treatment provided mild relief.       Constitution: Positive for sweating. Negative for activity change, appetite change, chills, fatigue, fever and generalized weakness.   HENT:  Positive for congestion, sore throat and trouble swallowing. Negative for ear pain, ear discharge, foreign body in ear, tinnitus, hearing loss, facial swelling, nosebleeds, foreign body in nose, postnasal drip, sinus pain, sinus pressure and voice change.    Neck: Negative for neck pain, neck stiffness and neck swelling.   Cardiovascular:  Negative for chest pain, leg swelling, palpitations and sob on exertion.   Eyes:  Negative  for eye discharge, eye itching, eye pain and eye redness.   Respiratory:  Positive for cough. Negative for chest tightness, sputum production, shortness of breath, wheezing and asthma.    Gastrointestinal:  Negative for abdominal pain, nausea, vomiting, constipation and diarrhea.   Genitourinary:  Negative for dysuria, frequency, urgency, urine decreased, flank pain and hematuria.   Musculoskeletal:  Negative for pain, pain with walking and muscle ache.   Skin:  Negative for rash, erythema and bruising.   Allergic/Immunologic: Negative for environmental allergies, seasonal allergies, food allergies, asthma, chronic cough, sneezing and flu shot.   Neurological:  Positive for headaches. Negative for dizziness, light-headedness, passing out, coordination disturbances, loss of balance, disorientation and altered mental status.   Psychiatric/Behavioral:  Negative for altered mental status, disorientation, confusion, agitation and nervous/anxious. The patient is not nervous/anxious.       Objective:     Physical Exam   Constitutional: He is oriented to person, place, and time. He appears well-developed. He is cooperative.  Non-toxic appearance. He does not appear ill. No distress.   HENT:   Head: Normocephalic and atraumatic.   Ears:   Right Ear: Hearing, tympanic membrane, external ear and ear canal normal. There is cerumen present.   Left Ear: Hearing, tympanic membrane, external ear and ear canal normal. There is cerumen present.   Nose: Mucosal edema present. No rhinorrhea, purulent discharge or nasal deformity. No epistaxis. Right sinus exhibits no maxillary sinus tenderness and no frontal sinus tenderness. Left sinus exhibits no maxillary sinus tenderness and no frontal sinus tenderness.   Mouth/Throat: Uvula is midline, oropharynx is clear and moist and mucous membranes are normal. No trismus in the jaw. Normal dentition. No uvula swelling. Cobblestoning present. No oropharyngeal exudate, posterior oropharyngeal  edema, posterior oropharyngeal erythema or tonsillar abscesses. Tonsils are 0 on the right. Tonsils are 1+ on the left. No tonsillar exudate.   Extensive ear wax was removed bilaterally.  Mild middle ear effusion noted bilaterally.  No bulging, erythema, injection, retraction, swelling or tenderness noted following ear wax removal.      Comments: Extensive ear wax was removed bilaterally.  Mild middle ear effusion noted bilaterally.  No bulging, erythema, injection, retraction, swelling or tenderness noted following ear wax removal.  Eyes: Conjunctivae and lids are normal. No scleral icterus. Extraocular movement intact   Neck: Trachea normal and phonation normal. Neck supple. No edema present. No erythema present. No neck rigidity present.   Cardiovascular: Normal rate, regular rhythm, normal heart sounds and normal pulses.   Pulmonary/Chest: Effort normal and breath sounds normal. No stridor. No respiratory distress. He has no decreased breath sounds. He has no wheezes. He has no rhonchi. He has no rales.   Abdominal: Normal appearance.   Musculoskeletal: Normal range of motion.         General: No deformity. Normal range of motion.   Lymphadenopathy:     He has no cervical adenopathy.   Neurological: He is alert and oriented to person, place, and time. He exhibits normal muscle tone. Coordination normal.   Skin: Skin is warm, dry, intact, not diaphoretic and not pale. No erythema   Psychiatric: His speech is normal and behavior is normal. Judgment and thought content normal.   Nursing note and vitals reviewed.      Assessment:     1. Acute rhinitis    2. Sore throat    3. Viral URI    4. Bilateral impacted cerumen        Plan:       Acute rhinitis  -     azelastine (ASTELIN) 137 mcg (0.1 %) nasal spray; 1 spray (137 mcg total) by Nasal route Daily.  Dispense: 30 mL; Refill: 0  -     fluticasone propionate (FLONASE) 50 mcg/actuation nasal spray; 1 spray (50 mcg total) by Each Nostril route once daily. for 10 days   Dispense: 16 g; Refill: 0  -     cetirizine (ZYRTEC) 10 MG tablet; Take 1 tablet (10 mg total) by mouth once daily.  Dispense: 30 tablet; Refill: 0    Sore throat  -     SARS Coronavirus 2 Antigen, POCT Manual Read    Viral URI  -     azelastine (ASTELIN) 137 mcg (0.1 %) nasal spray; 1 spray (137 mcg total) by Nasal route Daily.  Dispense: 30 mL; Refill: 0  -     fluticasone propionate (FLONASE) 50 mcg/actuation nasal spray; 1 spray (50 mcg total) by Each Nostril route once daily. for 10 days  Dispense: 16 g; Refill: 0  -     cetirizine (ZYRTEC) 10 MG tablet; Take 1 tablet (10 mg total) by mouth once daily.  Dispense: 30 tablet; Refill: 0    Bilateral impacted cerumen  -     Ear wax removal      Based off of physical exam there were no indications to swab for strep or full today in clinic.  Instructed patient if symptoms do worsen 5-7 days to return back to clinic needed.  There is no indication for antibiotics or steroids at this time.  Patient can take OTC Acetaminophen (Tylenol) and/or Ibuprofen (Motrin) as needed for pain relief and/or fever relief. Continue to drink plenty of fluids. Follow up with PCP.      Patient Instructions   Please drink plenty of fluids.  Please get plenty of rest.  Please return here or go to the Emergency Department for any concerns or worsening of condition.  Use azelastine/flonase nasal spray once daily ( patient will do 1 spray in each nostril of 1 nasal spray in the morning and 1 spray in each nostril the opposite nasal spray at night)  and take daily zyrtec as directed for ten days.  Instructed patient on day 5/10 if he prefers 1 nasal spray over the other than just to pick 1 nasal spray and do 1 spray in each nostril twice a day for the rest of the 10 day period.  Recommended taking vitamin D and zinc supplements for immune support.   Recommended for patient to drink hot tea with honey. Consider eating softer foods such as soup and broth for the next couple of days to prevent  further throat irritation. Recommended for patient to refrain from acidic foods (such as tomatoes or caffeine) to prevent throat irritation for the next couple of days.   If you do not have Hypertension or any history of palpitations, it is ok to take over the counter Sudafed or Mucinex D or Allegra-D or Claritin-D or Zyrtec-D.  If you do take one of the above, it is ok to combine that with plain over the counter Mucinex or Allegra or Claritin or Zyrtec.  If for example you are taking Zyrtec -D, you can combine that with Mucinex, but not Mucinex-D.  If you are taking Mucinex-D, you can combine that with plain Allegra or Claritin or Zyrtec.   If you do have Hypertension or palpitations, it is safe to take Coricidin HBP for relief of sinus symptoms.  If not allergic, please take over the counter Tylenol (Acetaminophen) and/or Motrin (Ibuprofen) as directed for control of pain and/or fever.  Please follow up with your primary care doctor or specialist as needed.    If you  smoke, please stop smoking.

## 2024-03-23 NOTE — LETTER
March 23, 2024      [facility] [address]  [place] [address]  Phone: [phone]  Fax: [phone]       Patient: Yossi James   YOB: 1995  Date of Visit: 03/23/2024    To Whom It May Concern:    Jan James  was at [facility] on 03/23/2024. The patient may return to work/school on 3/26/24 with no restrictions. If you have any questions or concerns, or if I can be of further assistance, please do not hesitate to contact me.    Sincerely,    Mariana King PA-C

## 2024-04-26 ENCOUNTER — TELEPHONE (OUTPATIENT)
Dept: INTERNAL MEDICINE | Facility: CLINIC | Age: 29
End: 2024-04-26
Payer: COMMERCIAL

## 2024-04-26 NOTE — TELEPHONE ENCOUNTER
Left voice message explaining the reason of cancellation of appointment, with instruction to call back to reschedule.

## 2024-09-13 NOTE — PROGRESS NOTES
Physical Therapy Daily Treatment Note     Name: Yossi James  Clinic Number: [id number]    Therapy Diagnosis:   1. Left knee pain, unspecified chronicity     2. Decreased range of motion of left knee     3. Difficulty walking     4. Decreased functional mobility     5. Decreased strength         Physician: Hubert Mcgrath MD    Visit Date: 2/25/2019    Physician Orders: PT Eval and Treat PWB LLE  Medical Diagnosis from Referral: L meniscus tear  Evaluation Date: 2/1/2019  Authorization Period Expiration: 12/31/2019  Plan of Care Expiration: 2/1/2019 to 4/26/2019  Visit # / Visits authorized: 7/ 30  FOTO: 7/10     Time In: 1030  Time Out: 1130  Total Billable Time: 60 minutes (Therapeutic exercise -4)     Precautions: Standard, PWB LLE    Subjective     Pt reports: went to the gym over weekend. No problems  : He was compliant with home exercise program.  Response to previous treatment: sore the day after  Functional change: none    Pain: 0/10  Location: left knee        Objective     Yossi received therapeutic exercises to develop strength, endurance, ROM and flexibility for 60 minutes including:    Bike x 10' for ROM (full)  Cybex Leg Press DL 8 plates 3x10  Cybex Knee Extension 50# DL 3x10  B steamboats Black TheraBand  3x10  L step ups (12 inch step) 3x10  L step downs (6 inch step) 2x10  Shuttle leg press L 2 bands 3x10   DL squats on bosu  3x10  Lunges on Bosu 3x10 L      Home Exercises Provided and Patient Education Provided     Education provided:   - continuing with gym routine  - dropping to 1x/week    Written Home Exercises Provided: Patient instructed to cont prior HEP.  Exercises were reviewed and Ysosi was able to demonstrate them prior to the end of the session.  Yossi demonstrated good  understanding of the education provided.    See EMR under Patient Instructions for exercises provided initial evaluation.       Assessment     Quad fatigue with muscle quivering with eccentric loading. Patient  Extended Golytely Bowel Prep  recommended due to BMI > 40.  and GLP-1 agonist medication noted in chart.  Instructions were sent via [employer]. Bowel prep was sent 9/13/2024 to    [id number] - [place], [place] - [address]         continues to demonstrate improved strength with good tolerance to increase level of activity.     Yossi is progressing well towards his goals.   Pt prognosis is Excellent.     Pt will continue to benefit from skilled outpatient physical therapy to address the deficits listed in the problem list box on initial evaluation, provide pt/family education and to maximize pt's level of independence in the home and community environment.     Pt's spiritual, cultural and educational needs considered and pt agreeable to plan of care and goals.    Anticipated barriers to physical therapy: Weightbearing precaution compliance    Goals:  Short Term Goals:   1. Pt will be independent with HEP to improve overall ROM and ambulation during functional tasks  (PROGRESSING, NOT MET)  2.. Pt will improve L knee AROM to equal R knee AROM  in order to improve gait  (PROGRESSING, NOT MET)   Long Term Goals:  1. Pt will be independent with updated HEP to return to full recreational activities  (PROGRESSING, NOT MET)  2. Pt will improve L LE strength to at least 4+/5  to improve functional mobility.  (PROGRESSING, NOT MET)  3. Pt will improve FOTO knee survey score to </= 10% limited in order to demo improved functional mobility  (PROGRESSING, NOT MET)  4. Patient will return to full recreational/gym activities without limitation or pain.  (PROGRESSING, NOT MET)      Plan     Continue with PT POC with emphasis on improved L knee ROM/strength.    Wai Medina Jr, PT

## 2024-11-11 ENCOUNTER — OFFICE VISIT (OUTPATIENT)
Dept: FAMILY MEDICINE | Facility: CLINIC | Age: 29
End: 2024-11-11
Payer: COMMERCIAL

## 2024-11-11 VITALS
HEIGHT: 72 IN | BODY MASS INDEX: 26.01 KG/M2 | WEIGHT: 192 LBS | DIASTOLIC BLOOD PRESSURE: 74 MMHG | SYSTOLIC BLOOD PRESSURE: 116 MMHG | HEART RATE: 94 BPM

## 2024-11-11 DIAGNOSIS — D75.89 MACROCYTOSIS WITHOUT ANEMIA: ICD-10-CM

## 2024-11-11 DIAGNOSIS — Z00.01 ENCOUNTER FOR GENERAL ADULT MEDICAL EXAMINATION WITH ABNORMAL FINDINGS: Primary | ICD-10-CM

## 2024-11-11 DIAGNOSIS — Z82.49 FAMILY HISTORY OF LONG QT SYNDROME: ICD-10-CM

## 2024-11-11 DIAGNOSIS — Z86.19 HISTORY OF HERPES LABIALIS: ICD-10-CM

## 2024-11-11 DIAGNOSIS — E66.3 OVERWEIGHT WITH BODY MASS INDEX (BMI) OF 26 TO 26.9 IN ADULT: ICD-10-CM

## 2024-11-11 DIAGNOSIS — Z86.59 HISTORY OF DEPRESSION: ICD-10-CM

## 2024-11-11 PROCEDURE — 99999 PR PBB SHADOW E&M-EST. PATIENT-LVL III: CPT | Mod: PBBFAC,,, | Performed by: FAMILY MEDICINE

## 2024-11-11 PROCEDURE — 1159F MED LIST DOCD IN RCRD: CPT | Mod: CPTII,S$GLB,, | Performed by: FAMILY MEDICINE

## 2024-11-11 PROCEDURE — 3074F SYST BP LT 130 MM HG: CPT | Mod: CPTII,S$GLB,, | Performed by: FAMILY MEDICINE

## 2024-11-11 PROCEDURE — 99395 PREV VISIT EST AGE 18-39: CPT | Mod: S$GLB,,, | Performed by: FAMILY MEDICINE

## 2024-11-11 PROCEDURE — 3008F BODY MASS INDEX DOCD: CPT | Mod: CPTII,S$GLB,, | Performed by: FAMILY MEDICINE

## 2024-11-11 PROCEDURE — 93010 ELECTROCARDIOGRAM REPORT: CPT | Mod: S$GLB,,, | Performed by: INTERNAL MEDICINE

## 2024-11-11 PROCEDURE — 3078F DIAST BP <80 MM HG: CPT | Mod: CPTII,S$GLB,, | Performed by: FAMILY MEDICINE

## 2024-11-11 PROCEDURE — 1160F RVW MEDS BY RX/DR IN RCRD: CPT | Mod: CPTII,S$GLB,, | Performed by: FAMILY MEDICINE

## 2024-11-11 PROCEDURE — 93005 ELECTROCARDIOGRAM TRACING: CPT | Mod: S$GLB,,, | Performed by: FAMILY MEDICINE

## 2024-11-11 NOTE — PROGRESS NOTES
"Subjective:         Patient ID: Yossi James is a 29 y.o. male.    Chief Complaint: Annual Exam    Patient Active Problem List   Diagnosis    History of herpes labialis    History of depression    Macrocytosis without anemia    Overweight with body mass index (BMI) of 26 to 26.9 in adult      HPI    Yossi is a 29 y.o. male    History of Present Illness    CHIEF COMPLAINT:  Yossi presents today for an annual follow-up visit.    MEDICAL HISTORY:  He has a history of slightly enlarged RBCs, consistent for at least five years. His last labs in 2023 showed A1c, thyroid, cholesterol, liver, and kidney function tests within normal limits. He has osteoarthritis in one knee, as well as a history of anxiety and depression. The depression was related to a delayed emotional response to his brother's cardiac arrest due to long QT syndrome.    FAMILY HISTORY:  He reports a family history of long QT syndrome in his brother and osteoporosis in his mother.    SOCIAL HISTORY:  He works as a  for at least 40 hours per week. He lives with his family and visits his girlfriend biweekly. He denies current smoking and alcohol use.    CURRENT SYMPTOMS:  He reports occasional stiff neck during sleep and feeling drained after work and walking his dog. He experiences infrequent cold sores, occurring approximately once a year, which typically resolve without intervention.    MENTAL HEALTH:  He denies any current depression or mood issues. Sleep has been reported as "okay for the most part."    MEDICATIONS:  He is not currently taking any medications.         Objective:     Vitals:    11/11/24 1031   BP: 116/74   BP Location: Left arm   Patient Position: Sitting   Pulse: 94   Weight: 87.1 kg (192 lb 0.3 oz)   Height: 6' (1.829 m)         Physical Exam  Vitals and nursing note reviewed.   Constitutional:       General: He is not in acute distress.     Appearance: Normal appearance. He is not ill-appearing, toxic-appearing or " diaphoretic.   HENT:      Head: Normocephalic and atraumatic.   Eyes:      General: No scleral icterus.     Conjunctiva/sclera: Conjunctivae normal.   Cardiovascular:      Rate and Rhythm: Normal rate.      Heart sounds: Normal heart sounds. No murmur heard.  Pulmonary:      Effort: Pulmonary effort is normal. No respiratory distress.      Breath sounds: Normal breath sounds.   Abdominal:      General: Bowel sounds are normal.   Skin:     Coloration: Skin is not pale.   Neurological:      Mental Status: He is alert. Mental status is at baseline.   Psychiatric:         Attention and Perception: Attention and perception normal.         Mood and Affect: Mood and affect normal.         Speech: Speech normal.         Behavior: Behavior normal.         Cognition and Memory: Cognition and memory normal.         Judgment: Judgment normal.       Assessment:       1. Encounter for general adult medical examination with abnormal findings    2. Macrocytosis without anemia    3. History of depression    4. History of herpes labialis    5. Overweight with body mass index (BMI) of 26 to 26.9 in adult    6. Family history of long QT syndrome          Plan:   Recent relevant labs results reviewed with patient.         Assessment & Plan    Assessed overall health status, noting no significant changes since last visit in April 2023  Reviewed previous lab results from 2023, including normal A1C, thyroid function, cholesterol panel, and liver/kidney function  Noted persistently large RBCs, likely genetic and not of concern  Considered family history of long QT syndrome in brother  Will perform EKG to screen for long QT syndrome, given family history and time since last EKG in 2019  Evaluated depression history, noting it was likely a response to brother's cardiac arrest  Assessed risk for future depressive episodes, particularly during winter months or around anniversary of brother's death    DEPRESSION:  Discussed potential triggers  for depression, including seasonal changes and anniversary reactions.  Explained the increased risk of future depressive episodes for patients with a history of depression.  Yossi to monitor mood, particularly during winter months and around the anniversary of brother's death.  Contact the office if experiencing symptoms of depression or need for further support.    LONG QT SYNDROME:  Advised against unnecessary vitamin intake due to family history of long QT syndrome.  EKG ordered to screen for long QT syndrome.    PHYSICAL ACTIVITY:  Yossi to continue current level of physical activity, including work-related activity and daily dog walks.    FOLLOW UP:  Follow up in 1 year for next annual exam.         1. Encounter for general adult medical examination with abnormal findings  -     Hepatic Function Panel; Future; Expected date: 11/11/2024  -     Renal Function Panel; Future; Expected date: 11/11/2024  -     Lipid Panel; Future; Expected date: 11/11/2024  -     TSH; Future; Expected date: 11/11/2024  -     Hemoglobin A1C; Future; Expected date: 11/11/2024  -     CBC Auto Differential; Future; Expected date: 11/11/2024  - Risk and age appropriate anticipatory guidance.  reviewed and updated. Recommendations discussed with patient as appropriate.     2. Macrocytosis without anemia  -     CBC Auto Differential; Future; Expected date: 11/11/2024  Stable    3. History of depression  Overview:  Wellbutrin (300mg) and Prozac (20mg) in past helpful.   No active symptoms. Related to bereavement and delayed grief.    4. History of herpes labialis  Stable  Yearly outbreaks, no treatment needed    5. Overweight with body mass index (BMI) of 26 to 26.9 in adult  Stable    6. Family history of long QT syndrome  -     IN OFFICE EKG 12-LEAD (to [place])      Patient's questions answered. Plan reviewed with patient at the end of visit. Relevant precautions to chief complaint and reasons to seek further medical care or to contact  the office sooner reviewed with patient.     Follow up in about 1 year (around 11/11/2025) for Annual Exam, (prelabs).        Part of this note was dictated using voice recognition software. Please excuse any typographical errors.     This note was generated with the assistance of ambient listening technology. Verbal consent was obtained by the patient and accompanying visitor(s) for the recording of patient appointment to facilitate this note. I attest to having reviewed and edited the generated note for accuracy, though some syntax or spelling errors may persist. Please contact the author of this note for any clarification.

## 2024-11-13 LAB
OHS QRS DURATION: 82 MS
OHS QTC CALCULATION: 437 MS

## 2025-03-10 ENCOUNTER — PATIENT MESSAGE (OUTPATIENT)
Dept: FAMILY MEDICINE | Facility: CLINIC | Age: 30
End: 2025-03-10
Payer: COMMERCIAL

## 2025-07-17 ENCOUNTER — OFFICE VISIT (OUTPATIENT)
Dept: URGENT CARE | Facility: CLINIC | Age: 30
End: 2025-07-17
Payer: COMMERCIAL

## 2025-07-17 VITALS
BODY MASS INDEX: 26.01 KG/M2 | RESPIRATION RATE: 16 BRPM | SYSTOLIC BLOOD PRESSURE: 136 MMHG | WEIGHT: 192 LBS | HEART RATE: 87 BPM | OXYGEN SATURATION: 98 % | DIASTOLIC BLOOD PRESSURE: 80 MMHG | HEIGHT: 72 IN | TEMPERATURE: 98 F

## 2025-07-17 DIAGNOSIS — J06.9 VIRAL URI WITH COUGH: Primary | ICD-10-CM

## 2025-07-17 DIAGNOSIS — H61.23 BILATERAL IMPACTED CERUMEN: ICD-10-CM

## 2025-07-17 DIAGNOSIS — R09.81 CONGESTION OF NASAL SINUS: ICD-10-CM

## 2025-07-17 DIAGNOSIS — J02.9 SORE THROAT: ICD-10-CM

## 2025-07-17 DIAGNOSIS — R09.82 PND (POST-NASAL DRIP): ICD-10-CM

## 2025-07-17 LAB
CTP QC/QA: YES
SARS-COV+SARS-COV-2 AG RESP QL IA.RAPID: NEGATIVE

## 2025-07-17 RX ORDER — FLUTICASONE PROPIONATE 50 MCG
1 SPRAY, SUSPENSION (ML) NASAL 2 TIMES DAILY
Qty: 15.8 ML | Refills: 0 | Status: SHIPPED | OUTPATIENT
Start: 2025-07-17 | End: 2025-08-16

## 2025-07-17 RX ORDER — PROMETHAZINE HYDROCHLORIDE AND DEXTROMETHORPHAN HYDROBROMIDE 6.25; 15 MG/5ML; MG/5ML
5 SYRUP ORAL NIGHTLY PRN
Qty: 180 ML | Refills: 0 | Status: SHIPPED | OUTPATIENT
Start: 2025-07-17 | End: 2025-08-22

## 2025-07-17 RX ORDER — GUAIFENESIN AND DEXTROMETHORPHAN HYDROBROMIDE 1200; 60 MG/1; MG/1
1 TABLET, EXTENDED RELEASE ORAL 2 TIMES DAILY
Qty: 20 TABLET | Refills: 0 | Status: SHIPPED | OUTPATIENT
Start: 2025-07-17 | End: 2025-07-27

## 2025-07-17 NOTE — LETTER
July 17, 2025      [facility] [place] [address]  [place] [address]  Phone: [phone]  Fax: [phone]       Patient: Yossi James   YOB: 1995  Date of Visit: 07/17/2025    To Whom It May Concern:    Jan James  was at [facility] on 07/17/2025. The patient may return to work/school on Friday, July 18th, 2025 with no restrictions. If you have any questions or concerns, or if I can be of further assistance, please do not hesitate to contact me.    Sincerely,          Ross Colbert PA-C

## 2025-07-17 NOTE — PROGRESS NOTES
Subjective:      Patient ID: Yossi James is a 29 y.o. male.    Vitals:  height is 6' (1.829 m) and weight is 87.1 kg (192 lb). His oral temperature is 98 °F (36.7 °C). His blood pressure is 136/80 and his pulse is 87. His respiration is 16 and oxygen saturation is 98%.     Chief Complaint: Sore Throat    Pt is a 29 y.o. male presenting with sore throat, pain with swallowing, cough, congestion, HA.  Onset of symptoms was this morning. Denies any CP, SOB, ABD pain, N/V/D. Pt reports using no OTC tx. Also would like ears flushed.    Sore Throat   This is a new problem. The current episode started today. The problem has been unchanged. The pain is at a severity of 3/10. The pain is mild. Associated symptoms include congestion, coughing, headaches and trouble swallowing. Pertinent negatives include no abdominal pain, diarrhea, drooling, ear discharge, ear pain, hoarse voice, plugged ear sensation, neck pain, shortness of breath, stridor, swollen glands or vomiting. He has had no exposure to strep or mono. He has tried nothing for the symptoms. The treatment provided no relief.     Constitution: Negative for activity change, appetite change, chills, fatigue and fever.   HENT:  Positive for congestion, sore throat and trouble swallowing. Negative for ear pain, ear discharge, drooling, postnasal drip, sinus pain and sinus pressure.    Neck: Negative for neck pain and neck swelling.   Cardiovascular:  Negative for chest pain and sob on exertion.   Eyes:  Negative for eye trauma, eye discharge and eye redness.   Respiratory:  Positive for cough. Negative for shortness of breath, stridor and wheezing.    Gastrointestinal:  Negative for abdominal pain, nausea, vomiting, constipation and diarrhea.   Genitourinary:  Negative for dysuria, frequency, urgency and urine decreased.   Musculoskeletal:  Negative for pain, joint pain, joint swelling, abnormal ROM of joint and muscle ache.   Skin:  Negative for color change, rash,  laceration and erythema.   Neurological:  Positive for headaches. Negative for dizziness, light-headedness, altered mental status and numbness.   Psychiatric/Behavioral:  Negative for altered mental status and confusion.       Objective:     Physical Exam   Constitutional: He is oriented to person, place, and time. He appears well-developed. He is cooperative.  Non-toxic appearance. He does not appear ill. No distress.      Comments:Pt sitting erect on examination table. No acute respiratory distress, no use of accessory muscles, no notice of nasal flaring.        HENT:   Head: Normocephalic and atraumatic.   Ears:   Right Ear: Hearing and external ear normal. impacted cerumen  Left Ear: Hearing and external ear normal. impacted cerumen  Nose: Congestion present. No mucosal edema, rhinorrhea or nasal deformity. No epistaxis. Right sinus exhibits no maxillary sinus tenderness and no frontal sinus tenderness. Left sinus exhibits no maxillary sinus tenderness and no frontal sinus tenderness.   Mouth/Throat: Uvula is midline and mucous membranes are normal. No trismus in the jaw. Normal dentition. No uvula swelling. Posterior oropharyngeal erythema present. No oropharyngeal exudate or posterior oropharyngeal edema.   Eyes: Conjunctivae and lids are normal. No scleral icterus.   Neck: Trachea normal and phonation normal. Neck supple. No edema present. No erythema present. No neck rigidity present.   Cardiovascular: Normal rate, regular rhythm, normal heart sounds and normal pulses.   Pulmonary/Chest: Effort normal and breath sounds normal. No accessory muscle usage. No apnea, no tachypnea and no bradypnea. No respiratory distress. He has no decreased breath sounds. He has no wheezes. He has no rhonchi.   Lungs clear to auscultation B/L           Comments: Lungs clear to auscultation B/L      Abdominal: Normal appearance.   Musculoskeletal: Normal range of motion.         General: No deformity. Normal range of motion.    Neurological: He is alert and oriented to person, place, and time. He exhibits normal muscle tone. Coordination normal.   Skin: Skin is warm, dry, intact, not diaphoretic and not pale. No erythema   Psychiatric: His speech is normal and behavior is normal. Judgment and thought content normal.   Nursing note and vitals reviewed.    Results for orders placed or performed in visit on 07/17/25   SARS Coronavirus 2 Antigen, POCT Manual Read    Collection Time: 07/17/25  8:31 AM   Result Value Ref Range    SARS Coronavirus 2 Antigen Negative Negative, Presumptive Negative     Acceptable Yes          Assessment:     1. Viral URI with cough    2. Sore throat    3. Bilateral impacted cerumen    4. PND (post-nasal drip)    5. Congestion of nasal sinus        Plan:   I have reviewed the patient chart and pertinent past imaging/labs.      Viral URI with cough  -     promethazine-dextromethorphan (PROMETHAZINE-DM) 6.25-15 mg/5 mL Syrp; Take 5 mLs by mouth nightly as needed.  Dispense: 180 mL; Refill: 0  -     dextromethorphan-guaiFENesin (MUCINEX DM) 60-1,200 mg per 12 hr tablet; Take 1 tablet by mouth 2 (two) times a day. for 10 days  Dispense: 20 tablet; Refill: 0    Sore throat  -     SARS Coronavirus 2 Antigen, POCT Manual Read    Bilateral impacted cerumen  -     Ear wax removal    PND (post-nasal drip)    Congestion of nasal sinus  -     fluticasone propionate (FLONASE) 50 mcg/actuation nasal spray; 1 spray (50 mcg total) by Each Nostril route 2 (two) times a day.  Dispense: 15.8 mL; Refill: 0

## 2025-07-21 ENCOUNTER — OFFICE VISIT (OUTPATIENT)
Dept: FAMILY MEDICINE | Facility: CLINIC | Age: 30
End: 2025-07-21
Payer: COMMERCIAL

## 2025-07-21 ENCOUNTER — PATIENT MESSAGE (OUTPATIENT)
Dept: FAMILY MEDICINE | Facility: CLINIC | Age: 30
End: 2025-07-21

## 2025-07-21 VITALS
HEART RATE: 88 BPM | WEIGHT: 190.5 LBS | HEIGHT: 72 IN | OXYGEN SATURATION: 96 % | TEMPERATURE: 99 F | SYSTOLIC BLOOD PRESSURE: 136 MMHG | BODY MASS INDEX: 25.8 KG/M2 | DIASTOLIC BLOOD PRESSURE: 84 MMHG

## 2025-07-21 DIAGNOSIS — U07.1 COVID-19: Primary | ICD-10-CM

## 2025-07-21 PROCEDURE — 87502 INFLUENZA DNA AMP PROBE: CPT | Mod: QW,S$GLB,,

## 2025-07-21 PROCEDURE — 99214 OFFICE O/P EST MOD 30 MIN: CPT | Mod: S$GLB,,,

## 2025-07-21 PROCEDURE — 1160F RVW MEDS BY RX/DR IN RCRD: CPT | Mod: CPTII,S$GLB,,

## 2025-07-21 PROCEDURE — 99999 PR PBB SHADOW E&M-EST. PATIENT-LVL IV: CPT | Mod: PBBFAC,,,

## 2025-07-21 PROCEDURE — 1159F MED LIST DOCD IN RCRD: CPT | Mod: CPTII,S$GLB,,

## 2025-07-21 PROCEDURE — 3075F SYST BP GE 130 - 139MM HG: CPT | Mod: CPTII,S$GLB,,

## 2025-07-21 PROCEDURE — 87635 SARS-COV-2 COVID-19 AMP PRB: CPT | Mod: QW,S$GLB,,

## 2025-07-21 PROCEDURE — 3008F BODY MASS INDEX DOCD: CPT | Mod: CPTII,S$GLB,,

## 2025-07-21 PROCEDURE — 3079F DIAST BP 80-89 MM HG: CPT | Mod: CPTII,S$GLB,,

## 2025-07-21 NOTE — PATIENT INSTRUCTIONS
Continue with supportive care, Tylenol every 4-6 hours as needed for fever, headaches, sore throat, ear pain, bodyaches, and/or nasal/sinus inflammation  Recommend to stay inside and isolate yourself from family members, as well as washing your hands frequently and drinking plenty of fluids. Wear a mask if you absolutely need to go out and practice social distancing.  Before resuming your regular activities, you should be fever-free for at least 72 hours WITHOUT taking any fever reducting medications (i.e, Tylenol) and/or being at least 5 days out from the initial positive test. Wear a facemask when around others. Cover your coughs and sneezes. Wash your hands often with soap and water; hand  can be used, too. Avoid sharing personal household items. Wipe down surfaces used daily.    Start Paxlovid 1 dose PO bid x5 days (1-day pack contains nirmatrelvir 150 mg tab x4 and ritonavir 100 mg tab x2).

## 2025-07-21 NOTE — PROGRESS NOTES
[facility]    7/21/2025      Subjective:       Patient ID:  Yossi is a 29 y.o. male .  has a past medical history of Anxiety, Depression, and Osteoarthritis of knee.    History of Present Illness    CHIEF COMPLAINT:  Mr. James presents today for follow up of COVID symptoms after urgent care visit.    COVID-19 SYMPTOMS:  He reports symptom onset early Thursday morning around 5-6am with predominantly nasal congestion, altered hearing perception, significant exhaustion, and notable loss of appetite. His highest temperature was 99.5°F yesterday morning, with temperatures on Thursday being within normal range. No further temperature elevation noted after the morning reading. His symptoms are ongoing at time of evaluation.    DIET:  He reports poor nutritional intake, consuming only a biscuit and chicken noodle soup today. Yesterday's intake was limited to pancakes in the morning.      ROS:  General: +fever, -chills, +fatigue, -weight gain, -weight loss, +loss of appetite  Eyes: -vision changes, -redness, -discharge  ENT: -ear pain, +nasal congestion, -sore throat, +difficulty hearing, +hearing loss  Cardiovascular: -chest pain, -palpitations, -lower extremity edema  Respiratory: -cough, -shortness of breath  Gastrointestinal: -abdominal pain, -nausea, -vomiting, -diarrhea, -constipation, -blood in stool  Genitourinary: -dysuria, -hematuria, -frequency  Musculoskeletal: -joint pain, -muscle pain  Skin: -rash, -lesion  Neurological: -headache, -dizziness, -numbness, -tingling  Psychiatric: -anxiety, -depression, -sleep difficulty           Problem List[1]      Last HgbA1C:    Lab Results   Component Value Date    HGBA1C 5.1 05/29/2023         Last Lipid Panel:    Lab Results   Component Value Date    HDL 72 05/29/2023    HDL 72 07/23/2019       Lab Results   Component Value Date    LDLCALC 105.6 05/29/2023    LDLCALC 102.6 07/23/2019       Lab Results   Component Value Date    TRIG  57 05/29/2023    TRIG 47 07/23/2019       Lab Results   Component Value Date    CHOLHDL 38.1 05/29/2023    CHOLHDL 39.1 07/23/2019         Review of patient's allergies indicates:  No Known Allergies     Medication List with Changes/Refills   Current Medications    DEXTROMETHORPHAN-GUAIFENESIN (MUCINEX DM) 60-1,200 MG PER 12 HR TABLET    Take 1 tablet by mouth 2 (two) times a day. for 10 days    FLUTICASONE PROPIONATE (FLONASE) 50 MCG/ACTUATION NASAL SPRAY    1 spray (50 mcg total) by Each Nostril route 2 (two) times a day.    PROMETHAZINE-DEXTROMETHORPHAN (PROMETHAZINE-DM) 6.25-15 MG/5 ML SYRP    Take 5 mLs by mouth nightly as needed.               Objective:      /84 (BP Location: Left arm, Patient Position: Sitting)   Pulse 88   Temp 98.9 °F (37.2 °C)   Ht 6' (1.829 m)   Wt 86.4 kg (190 lb 7.6 oz)   SpO2 96%   BMI 25.83 kg/m²   Estimated body mass index is 25.83 kg/m² as calculated from the following:    Height as of this encounter: 6' (1.829 m).    Weight as of this encounter: 86.4 kg (190 lb 7.6 oz).    Physical Exam  Vitals reviewed.   Constitutional:       General: He is not in acute distress.     Appearance: Normal appearance.   HENT:      Head: Normocephalic.   Eyes:      General: No scleral icterus.     Conjunctiva/sclera: Conjunctivae normal.   Cardiovascular:      Rate and Rhythm: Normal rate.   Pulmonary:      Effort: Pulmonary effort is normal. No respiratory distress.   Skin:     Coloration: Skin is not pale.   Neurological:      Mental Status: He is oriented to person, place, and time. Mental status is at baseline.   Psychiatric:         Mood and Affect: Mood normal.         Behavior: Behavior normal.             Assessment and Plan:   1. COVID-19  - POCT Influenza A/B Molecular  - POCT COVID-19 Rapid Screening    Confirmed positive  diagnosis despite initial negative test at urgent care.   Will provide work excuse note   Patient educated on symptomatic treatment with Mucinex Tylenol as  well as maintaining adequate hydration.  Pt to follow up if symptoms worsen   Assessment & Plan    PLAN SUMMARY:   Confirmed positive diagnosis for COVID-19   Provided education on early negative test results   Will provide work excuse note           The patient was informed of the following statements     Emergency Care:Seek immediate medical attention in the emergency room if you experience any new or worsening symptoms, or if your current condition significantly changes or becomes more severe.  Patient Acknowledgment: Patient verbalizes understanding of the plan and agrees to proceed with the recommended care.        Follow Up:  1/26/2026 Aarti Bobby MD   Future Appointments   Date Time Provider Department Center   1/5/2026  8:00 AM LAB, [place] [facility]   1/26/2026  8:00 AM Aarti Bobby MD [facility]                     No follow-ups on file.    Other Orders Placed This Visit:  Orders Placed This Encounter   Procedures    POCT Influenza A/B Molecular    POCT COVID-19 Rapid Screening         This note was generated with the assistance of ambient listening technology. Verbal consent was obtained by the patient and accompanying visitor(s) for the recording of patient appointment to facilitate this note. I attest to having reviewed and edited the generated note for accuracy, though some syntax or spelling errors may persist. Please contact the author of this note for any clarification.        Ye Machado PA-C             [1]   Patient Active Problem List  Diagnosis    History of herpes labialis    History of depression    Macrocytosis without anemia    Overweight with body mass index (BMI) of 26 to 26.9 in adult

## 2025-07-22 LAB
CTP QC/QA: YES
CTP QC/QA: YES
POC MOLECULAR INFLUENZA A AGN: NEGATIVE
POC MOLECULAR INFLUENZA B AGN: NEGATIVE
SARS-COV-2 RDRP RESP QL NAA+PROBE: POSITIVE

## (undated) DEVICE — GAUZE SPONGE 4X4 12PLY

## (undated) DEVICE — COVER OVERHEAD SURG LT BLUE

## (undated) DEVICE — SYS CLSR DERMABOND PRINEO 22CM

## (undated) DEVICE — SEE MEDLINE ITEM 157216

## (undated) DEVICE — MANIFOLD 4 PORT

## (undated) DEVICE — SYR 30CC LUER LOCK

## (undated) DEVICE — ALCOHOL 70% ISOP W/GREEN 16OZ

## (undated) DEVICE — SEE MEDLINE ITEM 152622

## (undated) DEVICE — SEE MEDLINE ITEM 146292

## (undated) DEVICE — TUBING 4-LEAD ARTHOSCOPY

## (undated) DEVICE — GLOVE 7.5 PROTEXIS PI ORTHO PF

## (undated) DEVICE — GLOVE 8 PROTEXIS PI BLUE

## (undated) DEVICE — BLADE SURG CARBON STEEL SZ11

## (undated) DEVICE — DRAPE PLASTIC U 60X72

## (undated) DEVICE — NDL SPINAL 18GX3.5 SPINOCAN

## (undated) DEVICE — SEE MEDLINE ITEM 146313

## (undated) DEVICE — Device

## (undated) DEVICE — DURAPREP SURG SCRUB 26ML

## (undated) DEVICE — MAT SUCTION PUDDLEVAC ORANGE

## (undated) DEVICE — SEE MEDLINE ITEM 156953

## (undated) DEVICE — PAD ABDOMINAL 5X9 STERILE

## (undated) DEVICE — NDL 18GA X1 1/2 REG BEVEL

## (undated) DEVICE — CLOSURE SKIN STERI STRIP 1/2X4

## (undated) DEVICE — SYR ONLY LUER LOCK 20CC

## (undated) DEVICE — CLOSURE STERI STRIP .5X4IN TAN

## (undated) DEVICE — SEE MEDLINE ITEM 146271

## (undated) DEVICE — SEE MEDLINE ITEM 152530

## (undated) DEVICE — ADHESIVE DERMABOND ADVANCED